# Patient Record
Sex: MALE | Race: WHITE | Employment: OTHER | URBAN - METROPOLITAN AREA
[De-identification: names, ages, dates, MRNs, and addresses within clinical notes are randomized per-mention and may not be internally consistent; named-entity substitution may affect disease eponyms.]

---

## 2017-01-16 ENCOUNTER — TRANSFERRED RECORDS (OUTPATIENT)
Dept: HEALTH INFORMATION MANAGEMENT | Facility: CLINIC | Age: 72
End: 2017-01-16

## 2017-01-16 DIAGNOSIS — M79.651 PAIN OF RIGHT THIGH: ICD-10-CM

## 2017-01-16 DIAGNOSIS — Z89.611 HISTORY OF ABOVE KNEE AMPUTATION, RIGHT (H): Primary | ICD-10-CM

## 2017-01-24 ENCOUNTER — HOSPITAL ENCOUNTER (OUTPATIENT)
Dept: NUCLEAR MEDICINE | Facility: CLINIC | Age: 72
Setting detail: NUCLEAR MEDICINE
Discharge: HOME | End: 2017-01-24
Attending: ORTHOPAEDIC SURGERY | Admitting: ORTHOPAEDIC SURGERY
Payer: MEDICARE

## 2017-01-24 ENCOUNTER — HOSPITAL ENCOUNTER (OUTPATIENT)
Dept: NUCLEAR MEDICINE | Facility: CLINIC | Age: 72
Setting detail: NUCLEAR MEDICINE
End: 2017-01-24
Attending: ORTHOPAEDIC SURGERY
Payer: MEDICARE

## 2017-01-24 DIAGNOSIS — M79.651 PAIN OF RIGHT THIGH: ICD-10-CM

## 2017-01-24 DIAGNOSIS — Z89.611 HISTORY OF ABOVE KNEE AMPUTATION, RIGHT (H): ICD-10-CM

## 2017-01-24 PROCEDURE — 34300033 ZZH RX 343: Performed by: ORTHOPAEDIC SURGERY

## 2017-01-24 PROCEDURE — A9561 TC99M OXIDRONATE: HCPCS | Performed by: ORTHOPAEDIC SURGERY

## 2017-01-24 PROCEDURE — 78315 BONE IMAGING 3 PHASE: CPT

## 2017-01-24 RX ADMIN — Medication 24.6 MILLICURIE: at 10:35

## 2017-06-19 ENCOUNTER — TRANSFERRED RECORDS (OUTPATIENT)
Dept: HEALTH INFORMATION MANAGEMENT | Facility: CLINIC | Age: 72
End: 2017-06-19

## 2017-08-17 ENCOUNTER — TELEPHONE (OUTPATIENT)
Dept: ORTHOPEDICS | Facility: CLINIC | Age: 72
End: 2017-08-17

## 2017-09-26 ENCOUNTER — HOSPITAL ENCOUNTER (OUTPATIENT)
Dept: EDUCATION SERVICES | Facility: CLINIC | Age: 72
End: 2017-09-26
Payer: MEDICARE

## 2017-09-26 NOTE — PLAN OF CARE
09/26/17 1444     Alisha Lezama-Registered Nurse (Nursing)  9/26/17 Patient (pt) and wife  to PLC Pre Op Joint Replacement group class. Both very attentive,asking questions,able to answer teach back,and verbalized understanding of content presented.Continue to reinforce information.Pt stated he also viewed the on line class. Pt's wife still feeling the pt may need a TCU as she is concerned she will not be able to physically help him the first few days.Florida Moses(Care Coordinator) in the clinic contacted.Pt and wife will address items discussed in class to get the home ready and possible other caregivers to help out.See also education flow sheet.  Written material given and reviewed in group class: PLC Class packet and Joint Replacement Book.

## 2017-09-27 ENCOUNTER — TRANSFERRED RECORDS (OUTPATIENT)
Dept: HEALTH INFORMATION MANAGEMENT | Facility: CLINIC | Age: 72
End: 2017-09-27

## 2017-10-27 RX ORDER — TAMSULOSIN HYDROCHLORIDE 0.4 MG/1
0.4 CAPSULE ORAL DAILY
COMMUNITY

## 2017-10-31 ENCOUNTER — ANESTHESIA EVENT (OUTPATIENT)
Dept: SURGERY | Facility: CLINIC | Age: 72
DRG: 470 | End: 2017-10-31
Payer: MEDICARE

## 2017-11-01 ASSESSMENT — LIFESTYLE VARIABLES: TOBACCO_USE: 1

## 2017-11-01 NOTE — ANESTHESIA PREPROCEDURE EVALUATION
Anesthesia Evaluation     . Pt has had prior anesthetic. Type: General    No history of anesthetic complications          ROS/MED HX    ENT/Pulmonary:     (+)tobacco use, Past use asthma , . .    Neurologic:  - neg neurologic ROS     Cardiovascular:     (+) Dyslipidemia, ----. : . . . :. . Previous cardiac testing date:results:date: results:ECG reviewed date:10/18/2017 results:Incomplete RBBB date: results:          METS/Exercise Tolerance:     Hematologic:         Musculoskeletal:   (+) arthritis, , , other musculoskeletal- Right above the knee amputation      GI/Hepatic: Comment: Crohn's    (+) Other GI/Hepatic       Renal/Genitourinary:  - ROS Renal section negative       Endo:     (+) thyroid problem hypothyroidism, .      Psychiatric:     (+) psychiatric history depression      Infectious Disease:         Malignancy:   (+) Malignancy History of Prostate and Other  Prostate CA Remission status post Surgery, Other CA Bladder cancer Remission status post Surgery         Other:                                    Anesthesia Plan      History & Physical Review  History and physical reviewed and following examination; no interval change.    ASA Status:  3 .    NPO Status:  > 8 hours    Plan for Spinal and Periph. Nerve Block for postop pain with Intravenous and Propofol induction. Maintenance will be TIVA.    PONV prophylaxis:  Ondansetron (or other 5HT-3) and Dexamethasone or Solumedrol       Postoperative Care  Postoperative pain management:  Multi-modal analgesia.      Consents  Anesthetic plan, risks, benefits and alternatives discussed with:  Patient..        ANESTHESIA PREOP EVALUATION    Procedure: Procedure(s):  Left Total Knee Arthroplasty                   (choice anesthesia) - Wound Class:     HPI: Palomo Zapata is a 72 year old male with Asthma, hypothyroidism and history of bladder and prostate cancer s/p surgery presenting for above procedure.    PMHx/PSHx/ROS:  Past Medical History:   Diagnosis Date      Above knee amputation of right lower extremity (H)      Anxiety      Cancer (H)      Complication of anesthesia     BP low after spinal      Depressive disorder      Thyroid disease      Uncomplicated asthma        Past Surgical History:   Procedure Laterality Date     GENITOURINARY SURGERY       ORTHOPEDIC SURGERY           Past Anes Hx: No personal or family h/o anesthesia problems    Soc Hx:   Social History   Substance Use Topics     Smoking status: Former Smoker     Quit date: 10/27/1988     Smokeless tobacco: Never Used     Alcohol use No       Allergies:   Allergies   Allergen Reactions     Penicillins      Palomo does not remember what the reaction was but it was said he is allergic       Meds:   No prescriptions prior to admission.       Current Outpatient Prescriptions   Medication Sig Dispense Refill     Finasteride (PROSCAR PO) Take 5 mg by mouth daily       tamsulosin (FLOMAX) 0.4 MG capsule Take by mouth daily       Escitalopram Oxalate (LEXAPRO PO) Take by mouth daily       beclomethasone (QVAR) 40 MCG/ACT Inhaler Inhale 1 puff into the lungs 2 times daily       ATORVASTATIN CALCIUM PO Take 10 mg by mouth       LEVOTHYROXINE SODIUM PO        prednisoLONE sodium phosphate (INFLAMASE FORTE) 1 % ophthalmic solution 1 drop 4 times daily         Physical Exam:  Vitals: There were no vitals taken for this visit.  BMI= There is no height or weight on file to calculate BMI.      Labs:  UPT: No results found for: HCGQUANT      BMP:  Recent Labs   Lab Test  07/03/16 2117   NA  130*   POTASSIUM  4.5   CHLORIDE  96   CO2  29   BUN  16   CR  0.75   GLC  102*   MÓNICA  8.6     CBC:   Recent Labs   Lab Test  07/03/16 2117   WBC  18.0*   RBC  4.02*   HGB  12.2*   HCT  37.1*   MCV  92   MCH  30.3   MCHC  32.9   RDW  12.8   PLT  374     Coags:  Recent Labs   Lab Test  08/15/10   0653   INR  1.17*       Assessment/Plan:  - ASA 3  - GETA with standard ASA monitors, IV induction, balanced anesthetic  - Pre-Op   -  Versed 0-2 mg   - Tylenol, Gabapentin   - Pre-Op regional block  - Pre-induction:   - PIVx1   - No arterial line, No central line   - Antibiotics per surgeon  - Induction:   - LMA   - Propofol,Fentanyl  - Maintenance:   - Sevoflurane   - Analgesia: Fentanyl   - Fluids: LR 1 mL/kg/hr maint  - Emergence:   - PONV prophylaxis: becky Jimenez Jr., MD  Anesthesia Resident - CA2  Pager: [phone]  11/1/2017  1:29 PM

## 2017-11-02 ENCOUNTER — APPOINTMENT (OUTPATIENT)
Dept: GENERAL RADIOLOGY | Facility: CLINIC | Age: 72
DRG: 470 | End: 2017-11-02
Attending: ORTHOPAEDIC SURGERY
Payer: MEDICARE

## 2017-11-02 ENCOUNTER — HOSPITAL ENCOUNTER (INPATIENT)
Facility: CLINIC | Age: 72
LOS: 3 days | Discharge: NURSING, REHAB OR LONG-TERM CARE | DRG: 470 | End: 2017-11-05
Attending: ORTHOPAEDIC SURGERY | Admitting: ORTHOPAEDIC SURGERY
Payer: MEDICARE

## 2017-11-02 ENCOUNTER — ANESTHESIA (OUTPATIENT)
Dept: SURGERY | Facility: CLINIC | Age: 72
DRG: 470 | End: 2017-11-02
Payer: MEDICARE

## 2017-11-02 DIAGNOSIS — Z29.89 NEED FOR PROPHYLAXIS AGAINST URINARY TRACT INFECTION: ICD-10-CM

## 2017-11-02 DIAGNOSIS — Z98.890 STATUS POST KNEE SURGERY: Primary | ICD-10-CM

## 2017-11-02 DIAGNOSIS — E78.5 HYPERLIPIDEMIA LDL GOAL <100: ICD-10-CM

## 2017-11-02 LAB
ABO + RH BLD: NORMAL
ABO + RH BLD: NORMAL
ALBUMIN UR-MCNC: NEGATIVE MG/DL
APPEARANCE UR: ABNORMAL
BILIRUB UR QL STRIP: NEGATIVE
BLD GP AB SCN SERPL QL: NORMAL
BLOOD BANK CMNT PATIENT-IMP: NORMAL
COLOR UR AUTO: ABNORMAL
CREAT SERPL-MCNC: 1.06 MG/DL (ref 0.66–1.25)
GFR SERPL CREATININE-BSD FRML MDRD: 68 ML/MIN/1.7M2
GLUCOSE SERPL-MCNC: 98 MG/DL (ref 70–99)
GLUCOSE UR STRIP-MCNC: NEGATIVE MG/DL
HGB UR QL STRIP: ABNORMAL
HYALINE CASTS #/AREA URNS LPF: 1 /LPF (ref 0–2)
KETONES UR STRIP-MCNC: NEGATIVE MG/DL
LEUKOCYTE ESTERASE UR QL STRIP: ABNORMAL
NITRATE UR QL: NEGATIVE
PH UR STRIP: 7 PH (ref 5–7)
POTASSIUM SERPL-SCNC: 4 MMOL/L (ref 3.4–5.3)
RBC #/AREA URNS AUTO: 1 /HPF (ref 0–2)
SOURCE: ABNORMAL
SP GR UR STRIP: 1 (ref 1–1.03)
SPECIMEN EXP DATE BLD: NORMAL
UROBILINOGEN UR STRIP-MCNC: NORMAL MG/DL (ref 0–2)
WBC #/AREA URNS AUTO: 165 /HPF (ref 0–2)

## 2017-11-02 PROCEDURE — 25000125 ZZHC RX 250: Performed by: STUDENT IN AN ORGANIZED HEALTH CARE EDUCATION/TRAINING PROGRAM

## 2017-11-02 PROCEDURE — 71000014 ZZH RECOVERY PHASE 1 LEVEL 2 FIRST HR: Performed by: ORTHOPAEDIC SURGERY

## 2017-11-02 PROCEDURE — 25000125 ZZHC RX 250: Performed by: ORTHOPAEDIC SURGERY

## 2017-11-02 PROCEDURE — 25000128 H RX IP 250 OP 636: Performed by: ANESTHESIOLOGY

## 2017-11-02 PROCEDURE — 25000128 H RX IP 250 OP 636: Performed by: STUDENT IN AN ORGANIZED HEALTH CARE EDUCATION/TRAINING PROGRAM

## 2017-11-02 PROCEDURE — C1713 ANCHOR/SCREW BN/BN,TIS/BN: HCPCS | Performed by: ORTHOPAEDIC SURGERY

## 2017-11-02 PROCEDURE — 99207 ZZC CONSULT E&M CHANGED TO SUBSEQUENT LEVEL: CPT | Performed by: INTERNAL MEDICINE

## 2017-11-02 PROCEDURE — 36000064 ZZH SURGERY LEVEL 4 EA 15 ADDTL MIN - UMMC: Performed by: ORTHOPAEDIC SURGERY

## 2017-11-02 PROCEDURE — 84132 ASSAY OF SERUM POTASSIUM: CPT | Performed by: ANESTHESIOLOGY

## 2017-11-02 PROCEDURE — 27210794 ZZH OR GENERAL SUPPLY STERILE: Performed by: ORTHOPAEDIC SURGERY

## 2017-11-02 PROCEDURE — 12000001 ZZH R&B MED SURG/OB UMMC

## 2017-11-02 PROCEDURE — 25000128 H RX IP 250 OP 636

## 2017-11-02 PROCEDURE — 82947 ASSAY GLUCOSE BLOOD QUANT: CPT | Performed by: ANESTHESIOLOGY

## 2017-11-02 PROCEDURE — 25000128 H RX IP 250 OP 636: Performed by: ORTHOPAEDIC SURGERY

## 2017-11-02 PROCEDURE — A9270 NON-COVERED ITEM OR SERVICE: HCPCS | Mod: GY | Performed by: STUDENT IN AN ORGANIZED HEALTH CARE EDUCATION/TRAINING PROGRAM

## 2017-11-02 PROCEDURE — 27810169 ZZH OR IMPLANT GENERAL: Performed by: ORTHOPAEDIC SURGERY

## 2017-11-02 PROCEDURE — 36000062 ZZH SURGERY LEVEL 4 1ST 30 MIN - UMMC: Performed by: ORTHOPAEDIC SURGERY

## 2017-11-02 PROCEDURE — 86850 RBC ANTIBODY SCREEN: CPT | Performed by: ORTHOPAEDIC SURGERY

## 2017-11-02 PROCEDURE — 25800025 ZZH RX 258: Performed by: ORTHOPAEDIC SURGERY

## 2017-11-02 PROCEDURE — 86900 BLOOD TYPING SEROLOGIC ABO: CPT | Performed by: ORTHOPAEDIC SURGERY

## 2017-11-02 PROCEDURE — 40000170 ZZH STATISTIC PRE-PROCEDURE ASSESSMENT II: Performed by: ORTHOPAEDIC SURGERY

## 2017-11-02 PROCEDURE — C1776 JOINT DEVICE (IMPLANTABLE): HCPCS | Performed by: ORTHOPAEDIC SURGERY

## 2017-11-02 PROCEDURE — 71000015 ZZH RECOVERY PHASE 1 LEVEL 2 EA ADDTL HR: Performed by: ORTHOPAEDIC SURGERY

## 2017-11-02 PROCEDURE — 86901 BLOOD TYPING SEROLOGIC RH(D): CPT | Performed by: ORTHOPAEDIC SURGERY

## 2017-11-02 PROCEDURE — 25000132 ZZH RX MED GY IP 250 OP 250 PS 637: Mod: GY | Performed by: ORTHOPAEDIC SURGERY

## 2017-11-02 PROCEDURE — S0077 INJECTION, CLINDAMYCIN PHOSP: HCPCS | Performed by: ORTHOPAEDIC SURGERY

## 2017-11-02 PROCEDURE — 82565 ASSAY OF CREATININE: CPT | Performed by: ANESTHESIOLOGY

## 2017-11-02 PROCEDURE — 99232 SBSQ HOSP IP/OBS MODERATE 35: CPT | Performed by: INTERNAL MEDICINE

## 2017-11-02 PROCEDURE — 87086 URINE CULTURE/COLONY COUNT: CPT | Performed by: ORTHOPAEDIC SURGERY

## 2017-11-02 PROCEDURE — 25000132 ZZH RX MED GY IP 250 OP 250 PS 637: Mod: GY | Performed by: INTERNAL MEDICINE

## 2017-11-02 PROCEDURE — 37000008 ZZH ANESTHESIA TECHNICAL FEE, 1ST 30 MIN: Performed by: ORTHOPAEDIC SURGERY

## 2017-11-02 PROCEDURE — 81001 URINALYSIS AUTO W/SCOPE: CPT | Performed by: ORTHOPAEDIC SURGERY

## 2017-11-02 PROCEDURE — 36415 COLL VENOUS BLD VENIPUNCTURE: CPT | Performed by: ORTHOPAEDIC SURGERY

## 2017-11-02 PROCEDURE — 0SRD0J9 REPLACEMENT OF LEFT KNEE JOINT WITH SYNTHETIC SUBSTITUTE, CEMENTED, OPEN APPROACH: ICD-10-PCS | Performed by: ORTHOPAEDIC SURGERY

## 2017-11-02 PROCEDURE — 25000125 ZZHC RX 250: Performed by: ANESTHESIOLOGY

## 2017-11-02 PROCEDURE — 25000128 H RX IP 250 OP 636: Performed by: INTERNAL MEDICINE

## 2017-11-02 PROCEDURE — A9270 NON-COVERED ITEM OR SERVICE: HCPCS | Mod: GY | Performed by: ORTHOPAEDIC SURGERY

## 2017-11-02 PROCEDURE — A9270 NON-COVERED ITEM OR SERVICE: HCPCS | Mod: GY | Performed by: INTERNAL MEDICINE

## 2017-11-02 PROCEDURE — C9290 INJ, BUPIVACAINE LIPOSOME: HCPCS | Performed by: ANESTHESIOLOGY

## 2017-11-02 PROCEDURE — 37000009 ZZH ANESTHESIA TECHNICAL FEE, EACH ADDTL 15 MIN: Performed by: ORTHOPAEDIC SURGERY

## 2017-11-02 PROCEDURE — 25000132 ZZH RX MED GY IP 250 OP 250 PS 637: Mod: GY | Performed by: STUDENT IN AN ORGANIZED HEALTH CARE EDUCATION/TRAINING PROGRAM

## 2017-11-02 PROCEDURE — 27210995 ZZH RX 272: Performed by: ORTHOPAEDIC SURGERY

## 2017-11-02 PROCEDURE — 40000986 XR KNEE PORT LT 1/2 VW: Mod: LT

## 2017-11-02 DEVICE — IMPLANTABLE DEVICE: Type: IMPLANTABLE DEVICE | Site: KNEE | Status: FUNCTIONAL

## 2017-11-02 DEVICE — BONE CEMENT SIMPLEX W/TOBRAMYCIN 6197-9-001: Type: IMPLANTABLE DEVICE | Site: KNEE | Status: FUNCTIONAL

## 2017-11-02 RX ORDER — BUPIVACAINE HYDROCHLORIDE AND EPINEPHRINE 2.5; 5 MG/ML; UG/ML
INJECTION, SOLUTION INFILTRATION; PERINEURAL PRN
Status: DISCONTINUED | OUTPATIENT
Start: 2017-11-02 | End: 2017-11-02

## 2017-11-02 RX ORDER — FINASTERIDE 5 MG/1
5 TABLET, FILM COATED ORAL DAILY
Status: DISCONTINUED | OUTPATIENT
Start: 2017-11-03 | End: 2017-11-05 | Stop reason: HOSPADM

## 2017-11-02 RX ORDER — ONDANSETRON 2 MG/ML
INJECTION INTRAMUSCULAR; INTRAVENOUS PRN
Status: DISCONTINUED | OUTPATIENT
Start: 2017-11-02 | End: 2017-11-02

## 2017-11-02 RX ORDER — FENTANYL CITRATE 50 UG/ML
25-50 INJECTION, SOLUTION INTRAMUSCULAR; INTRAVENOUS
Status: DISCONTINUED | OUTPATIENT
Start: 2017-11-02 | End: 2017-11-02 | Stop reason: HOSPADM

## 2017-11-02 RX ORDER — ACETAMINOPHEN 325 MG/1
650 TABLET ORAL EVERY 4 HOURS PRN
Status: DISCONTINUED | OUTPATIENT
Start: 2017-11-05 | End: 2017-11-05 | Stop reason: HOSPADM

## 2017-11-02 RX ORDER — FLUMAZENIL 0.1 MG/ML
0.2 INJECTION, SOLUTION INTRAVENOUS
Status: DISCONTINUED | OUTPATIENT
Start: 2017-11-02 | End: 2017-11-02 | Stop reason: CLARIF

## 2017-11-02 RX ORDER — SODIUM CHLORIDE, SODIUM LACTATE, POTASSIUM CHLORIDE, CALCIUM CHLORIDE 600; 310; 30; 20 MG/100ML; MG/100ML; MG/100ML; MG/100ML
INJECTION, SOLUTION INTRAVENOUS CONTINUOUS
Status: DISCONTINUED | OUTPATIENT
Start: 2017-11-02 | End: 2017-11-05 | Stop reason: HOSPADM

## 2017-11-02 RX ORDER — SODIUM CHLORIDE 9 MG/ML
INJECTION, SOLUTION INTRAVENOUS CONTINUOUS
Status: DISCONTINUED | OUTPATIENT
Start: 2017-11-02 | End: 2017-11-05 | Stop reason: HOSPADM

## 2017-11-02 RX ORDER — CLINDAMYCIN PHOSPHATE 900 MG/50ML
900 INJECTION, SOLUTION INTRAVENOUS EVERY 8 HOURS
Status: COMPLETED | OUTPATIENT
Start: 2017-11-02 | End: 2017-11-03

## 2017-11-02 RX ORDER — LABETALOL HYDROCHLORIDE 5 MG/ML
10 INJECTION, SOLUTION INTRAVENOUS
Status: DISCONTINUED | OUTPATIENT
Start: 2017-11-02 | End: 2017-11-02 | Stop reason: HOSPADM

## 2017-11-02 RX ORDER — SODIUM CHLORIDE, SODIUM LACTATE, POTASSIUM CHLORIDE, CALCIUM CHLORIDE 600; 310; 30; 20 MG/100ML; MG/100ML; MG/100ML; MG/100ML
INJECTION, SOLUTION INTRAVENOUS CONTINUOUS PRN
Status: DISCONTINUED | OUTPATIENT
Start: 2017-11-02 | End: 2017-11-02

## 2017-11-02 RX ORDER — NALOXONE HYDROCHLORIDE 0.4 MG/ML
.1-.4 INJECTION, SOLUTION INTRAMUSCULAR; INTRAVENOUS; SUBCUTANEOUS
Status: DISCONTINUED | OUTPATIENT
Start: 2017-11-02 | End: 2017-11-02

## 2017-11-02 RX ORDER — GABAPENTIN 300 MG/1
300 CAPSULE ORAL ONCE
Status: COMPLETED | OUTPATIENT
Start: 2017-11-02 | End: 2017-11-02

## 2017-11-02 RX ORDER — MAGNESIUM HYDROXIDE 1200 MG/15ML
LIQUID ORAL PRN
Status: DISCONTINUED | OUTPATIENT
Start: 2017-11-02 | End: 2017-11-02 | Stop reason: HOSPADM

## 2017-11-02 RX ORDER — FENTANYL CITRATE 50 UG/ML
25-50 INJECTION, SOLUTION INTRAMUSCULAR; INTRAVENOUS
Status: DISCONTINUED | OUTPATIENT
Start: 2017-11-02 | End: 2017-11-02 | Stop reason: CLARIF

## 2017-11-02 RX ORDER — DIPHENHYDRAMINE HYDROCHLORIDE 50 MG/ML
12.5 INJECTION INTRAMUSCULAR; INTRAVENOUS EVERY 6 HOURS PRN
Status: DISCONTINUED | OUTPATIENT
Start: 2017-11-02 | End: 2017-11-05 | Stop reason: HOSPADM

## 2017-11-02 RX ORDER — PROCHLORPERAZINE MALEATE 5 MG
5 TABLET ORAL EVERY 6 HOURS PRN
Status: DISCONTINUED | OUTPATIENT
Start: 2017-11-02 | End: 2017-11-05 | Stop reason: HOSPADM

## 2017-11-02 RX ORDER — DIPHENHYDRAMINE HCL 12.5MG/5ML
12.5 LIQUID (ML) ORAL EVERY 6 HOURS PRN
Status: DISCONTINUED | OUTPATIENT
Start: 2017-11-02 | End: 2017-11-05 | Stop reason: HOSPADM

## 2017-11-02 RX ORDER — EPHEDRINE SULFATE 50 MG/ML
INJECTION, SOLUTION INTRAVENOUS PRN
Status: DISCONTINUED | OUTPATIENT
Start: 2017-11-02 | End: 2017-11-02

## 2017-11-02 RX ORDER — ACETAMINOPHEN 325 MG/1
975 TABLET ORAL ONCE
Status: COMPLETED | OUTPATIENT
Start: 2017-11-02 | End: 2017-11-02

## 2017-11-02 RX ORDER — LATANOPROST 50 UG/ML
1 SOLUTION/ DROPS OPHTHALMIC AT BEDTIME
COMMUNITY

## 2017-11-02 RX ORDER — TAMSULOSIN HYDROCHLORIDE 0.4 MG/1
0.4 CAPSULE ORAL DAILY
Status: DISCONTINUED | OUTPATIENT
Start: 2017-11-03 | End: 2017-11-05 | Stop reason: HOSPADM

## 2017-11-02 RX ORDER — LATANOPROST 50 UG/ML
1 SOLUTION/ DROPS OPHTHALMIC AT BEDTIME
Status: DISCONTINUED | OUTPATIENT
Start: 2017-11-02 | End: 2017-11-05 | Stop reason: HOSPADM

## 2017-11-02 RX ORDER — ONDANSETRON 4 MG/1
4 TABLET, ORALLY DISINTEGRATING ORAL EVERY 6 HOURS PRN
Status: DISCONTINUED | OUTPATIENT
Start: 2017-11-02 | End: 2017-11-05 | Stop reason: HOSPADM

## 2017-11-02 RX ORDER — ATORVASTATIN CALCIUM 10 MG/1
10 TABLET, FILM COATED ORAL
Status: DISCONTINUED | OUTPATIENT
Start: 2017-11-02 | End: 2017-11-05 | Stop reason: HOSPADM

## 2017-11-02 RX ORDER — PROPOFOL 10 MG/ML
INJECTION, EMULSION INTRAVENOUS PRN
Status: DISCONTINUED | OUTPATIENT
Start: 2017-11-02 | End: 2017-11-02

## 2017-11-02 RX ORDER — METOCLOPRAMIDE HYDROCHLORIDE 5 MG/ML
5 INJECTION INTRAMUSCULAR; INTRAVENOUS EVERY 6 HOURS PRN
Status: DISCONTINUED | OUTPATIENT
Start: 2017-11-02 | End: 2017-11-05 | Stop reason: HOSPADM

## 2017-11-02 RX ORDER — SODIUM CHLORIDE, SODIUM LACTATE, POTASSIUM CHLORIDE, CALCIUM CHLORIDE 600; 310; 30; 20 MG/100ML; MG/100ML; MG/100ML; MG/100ML
INJECTION, SOLUTION INTRAVENOUS CONTINUOUS
Status: DISCONTINUED | OUTPATIENT
Start: 2017-11-02 | End: 2017-11-02 | Stop reason: HOSPADM

## 2017-11-02 RX ORDER — SODIUM CHLORIDE 9 MG/ML
INJECTION, SOLUTION INTRAVENOUS
Status: COMPLETED
Start: 2017-11-02 | End: 2017-11-02

## 2017-11-02 RX ORDER — ONDANSETRON 2 MG/ML
4 INJECTION INTRAMUSCULAR; INTRAVENOUS EVERY 30 MIN PRN
Status: DISCONTINUED | OUTPATIENT
Start: 2017-11-02 | End: 2017-11-02 | Stop reason: HOSPADM

## 2017-11-02 RX ORDER — GABAPENTIN 100 MG/1
100 CAPSULE ORAL
Status: DISCONTINUED | OUTPATIENT
Start: 2017-11-02 | End: 2017-11-02 | Stop reason: CLARIF

## 2017-11-02 RX ORDER — ACETAMINOPHEN 325 MG/1
975 TABLET ORAL EVERY 8 HOURS
Status: COMPLETED | OUTPATIENT
Start: 2017-11-02 | End: 2017-11-05

## 2017-11-02 RX ORDER — ESCITALOPRAM OXALATE 10 MG/1
20 TABLET ORAL DAILY
Status: DISCONTINUED | OUTPATIENT
Start: 2017-11-03 | End: 2017-11-05 | Stop reason: HOSPADM

## 2017-11-02 RX ORDER — NALOXONE HYDROCHLORIDE 0.4 MG/ML
.1-.4 INJECTION, SOLUTION INTRAMUSCULAR; INTRAVENOUS; SUBCUTANEOUS
Status: DISCONTINUED | OUTPATIENT
Start: 2017-11-02 | End: 2017-11-05 | Stop reason: HOSPADM

## 2017-11-02 RX ORDER — METOCLOPRAMIDE 5 MG/1
5 TABLET ORAL EVERY 6 HOURS PRN
Status: DISCONTINUED | OUTPATIENT
Start: 2017-11-02 | End: 2017-11-05 | Stop reason: HOSPADM

## 2017-11-02 RX ORDER — ONDANSETRON 4 MG/1
4 TABLET, ORALLY DISINTEGRATING ORAL EVERY 30 MIN PRN
Status: DISCONTINUED | OUTPATIENT
Start: 2017-11-02 | End: 2017-11-02 | Stop reason: HOSPADM

## 2017-11-02 RX ORDER — AMOXICILLIN 250 MG
1-2 CAPSULE ORAL 2 TIMES DAILY
Status: DISCONTINUED | OUTPATIENT
Start: 2017-11-02 | End: 2017-11-05 | Stop reason: HOSPADM

## 2017-11-02 RX ORDER — CLINDAMYCIN PHOSPHATE 900 MG/50ML
900 INJECTION, SOLUTION INTRAVENOUS
Status: COMPLETED | OUTPATIENT
Start: 2017-11-02 | End: 2017-11-02

## 2017-11-02 RX ORDER — CLINDAMYCIN PHOSPHATE 900 MG/50ML
900 INJECTION, SOLUTION INTRAVENOUS SEE ADMIN INSTRUCTIONS
Status: DISCONTINUED | OUTPATIENT
Start: 2017-11-02 | End: 2017-11-02 | Stop reason: CLARIF

## 2017-11-02 RX ORDER — BUPIVACAINE HYDROCHLORIDE AND EPINEPHRINE 5; 5 MG/ML; UG/ML
INJECTION, SOLUTION PERINEURAL PRN
Status: DISCONTINUED | OUTPATIENT
Start: 2017-11-02 | End: 2017-11-02 | Stop reason: HOSPADM

## 2017-11-02 RX ORDER — OXYCODONE HYDROCHLORIDE 5 MG/1
5-10 TABLET ORAL EVERY 4 HOURS PRN
Status: DISCONTINUED | OUTPATIENT
Start: 2017-11-02 | End: 2017-11-05

## 2017-11-02 RX ORDER — LIDOCAINE 40 MG/G
CREAM TOPICAL
Status: DISCONTINUED | OUTPATIENT
Start: 2017-11-02 | End: 2017-11-05 | Stop reason: HOSPADM

## 2017-11-02 RX ORDER — CELECOXIB 200 MG/1
200 CAPSULE ORAL ONCE
Status: COMPLETED | OUTPATIENT
Start: 2017-11-02 | End: 2017-11-02

## 2017-11-02 RX ORDER — PROPOFOL 10 MG/ML
INJECTION, EMULSION INTRAVENOUS CONTINUOUS PRN
Status: DISCONTINUED | OUTPATIENT
Start: 2017-11-02 | End: 2017-11-02

## 2017-11-02 RX ORDER — ONDANSETRON 2 MG/ML
4 INJECTION INTRAMUSCULAR; INTRAVENOUS EVERY 6 HOURS PRN
Status: DISCONTINUED | OUTPATIENT
Start: 2017-11-02 | End: 2017-11-05 | Stop reason: HOSPADM

## 2017-11-02 RX ORDER — BUPIVACAINE HYDROCHLORIDE 7.5 MG/ML
INJECTION, SOLUTION INTRASPINAL PRN
Status: DISCONTINUED | OUTPATIENT
Start: 2017-11-02 | End: 2017-11-02

## 2017-11-02 RX ADMIN — OXYCODONE HYDROCHLORIDE 5 MG: 5 TABLET ORAL at 22:10

## 2017-11-02 RX ADMIN — HYDROMORPHONE HYDROCHLORIDE 0.5 MG: 1 INJECTION, SOLUTION INTRAMUSCULAR; INTRAVENOUS; SUBCUTANEOUS at 15:00

## 2017-11-02 RX ADMIN — HYDROMORPHONE HYDROCHLORIDE 0.5 MG: 1 INJECTION, SOLUTION INTRAMUSCULAR; INTRAVENOUS; SUBCUTANEOUS at 13:38

## 2017-11-02 RX ADMIN — ONDANSETRON 4 MG: 2 INJECTION INTRAMUSCULAR; INTRAVENOUS at 12:35

## 2017-11-02 RX ADMIN — PROPOFOL 25 MCG/KG/MIN: 10 INJECTION, EMULSION INTRAVENOUS at 10:31

## 2017-11-02 RX ADMIN — BUPIVACAINE 10 ML: 13.3 INJECTION, SUSPENSION, LIPOSOMAL INFILTRATION at 10:04

## 2017-11-02 RX ADMIN — PHENYLEPHRINE HYDROCHLORIDE 100 MCG: 10 INJECTION, SOLUTION INTRAMUSCULAR; INTRAVENOUS; SUBCUTANEOUS at 12:57

## 2017-11-02 RX ADMIN — CLINDAMYCIN PHOSPHATE 900 MG: 18 INJECTION, SOLUTION INTRAVENOUS at 10:30

## 2017-11-02 RX ADMIN — ACETAMINOPHEN 975 MG: 325 TABLET, FILM COATED ORAL at 09:20

## 2017-11-02 RX ADMIN — FLUTICASONE FUROATE 1 PUFF: 100 POWDER RESPIRATORY (INHALATION) at 23:29

## 2017-11-02 RX ADMIN — SODIUM CHLORIDE, POTASSIUM CHLORIDE, SODIUM LACTATE AND CALCIUM CHLORIDE: 600; 310; 30; 20 INJECTION, SOLUTION INTRAVENOUS at 09:43

## 2017-11-02 RX ADMIN — FENTANYL CITRATE 30 MCG: 50 INJECTION INTRAMUSCULAR; INTRAVENOUS at 10:20

## 2017-11-02 RX ADMIN — PHENYLEPHRINE HYDROCHLORIDE 100 MCG: 10 INJECTION, SOLUTION INTRAMUSCULAR; INTRAVENOUS; SUBCUTANEOUS at 11:28

## 2017-11-02 RX ADMIN — LATANOPROST 1 DROP: 50 SOLUTION OPHTHALMIC at 23:27

## 2017-11-02 RX ADMIN — CELECOXIB 200 MG: 200 CAPSULE ORAL at 09:21

## 2017-11-02 RX ADMIN — PROPOFOL 10 MG: 10 INJECTION, EMULSION INTRAVENOUS at 12:08

## 2017-11-02 RX ADMIN — Medication 125 MCG: at 17:23

## 2017-11-02 RX ADMIN — ATORVASTATIN CALCIUM 10 MG: 10 TABLET, FILM COATED ORAL at 19:52

## 2017-11-02 RX ADMIN — BUPIVACAINE HYDROCHLORIDE AND EPINEPHRINE BITARTRATE 10 ML: 2.5; .005 INJECTION, SOLUTION INFILTRATION; PERINEURAL at 10:04

## 2017-11-02 RX ADMIN — BUPIVACAINE HYDROCHLORIDE IN DEXTROSE 1.6 ML: 7.5 INJECTION, SOLUTION SUBARACHNOID at 10:25

## 2017-11-02 RX ADMIN — GABAPENTIN 300 MG: 300 CAPSULE ORAL at 09:20

## 2017-11-02 RX ADMIN — EPHEDRINE SULFATE 10 MG: 50 INJECTION, SOLUTION INTRAVENOUS at 12:53

## 2017-11-02 RX ADMIN — ACETAMINOPHEN 975 MG: 325 TABLET, FILM COATED ORAL at 17:23

## 2017-11-02 RX ADMIN — PHENYLEPHRINE HYDROCHLORIDE 0.2 MCG/KG/MIN: 10 INJECTION, SOLUTION INTRAMUSCULAR; INTRAVENOUS; SUBCUTANEOUS at 10:52

## 2017-11-02 RX ADMIN — SODIUM CHLORIDE: 9 INJECTION, SOLUTION INTRAVENOUS at 17:23

## 2017-11-02 RX ADMIN — SODIUM CHLORIDE 1 G: 9 INJECTION, SOLUTION INTRAVENOUS at 10:32

## 2017-11-02 RX ADMIN — SODIUM CHLORIDE 500 ML: 9 INJECTION, SOLUTION INTRAVENOUS at 19:52

## 2017-11-02 RX ADMIN — PHENYLEPHRINE HYDROCHLORIDE 100 MCG: 10 INJECTION, SOLUTION INTRAMUSCULAR; INTRAVENOUS; SUBCUTANEOUS at 11:45

## 2017-11-02 RX ADMIN — PROPOFOL 20 MG: 10 INJECTION, EMULSION INTRAVENOUS at 11:43

## 2017-11-02 RX ADMIN — EPHEDRINE SULFATE 10 MG: 50 INJECTION, SOLUTION INTRAVENOUS at 11:33

## 2017-11-02 RX ADMIN — SODIUM CHLORIDE, POTASSIUM CHLORIDE, SODIUM LACTATE AND CALCIUM CHLORIDE: 600; 310; 30; 20 INJECTION, SOLUTION INTRAVENOUS at 10:39

## 2017-11-02 RX ADMIN — EPHEDRINE SULFATE 10 MG: 50 INJECTION, SOLUTION INTRAVENOUS at 12:35

## 2017-11-02 RX ADMIN — HYDROMORPHONE HYDROCHLORIDE 0.5 MG: 1 INJECTION, SOLUTION INTRAMUSCULAR; INTRAVENOUS; SUBCUTANEOUS at 13:25

## 2017-11-02 RX ADMIN — SENNOSIDES AND DOCUSATE SODIUM 2 TABLET: 8.6; 5 TABLET ORAL at 19:52

## 2017-11-02 RX ADMIN — FENTANYL CITRATE 25 MCG: 50 INJECTION INTRAMUSCULAR; INTRAVENOUS at 10:01

## 2017-11-02 RX ADMIN — EPHEDRINE SULFATE 5 MG: 50 INJECTION, SOLUTION INTRAVENOUS at 12:46

## 2017-11-02 RX ADMIN — CLINDAMYCIN PHOSPHATE 900 MG: 18 INJECTION, SOLUTION INTRAVENOUS at 18:13

## 2017-11-02 NOTE — IP AVS SNAPSHOT
MRN:[id number]                      After Visit Summary   11/2/2017    Palomo Zapata    MRN: [id number]           Thank you!     Thank you for choosing [facility] for your care. Our goal is always to provide you with excellent care. Hearing back from our patients is one way we can continue to improve our services. Please take a few minutes to complete the written survey that you may receive in the mail after you visit with us. Thank you!        Patient Information     Date Of Birth          1945        Designated Caregiver       Most Recent Value    Caregiver    Will someone help with your care after discharge? yes [rehab first]    Name of designated caregiver Ema    Phone number of caregiver Lives with pt    Caregiver address lives with pt      About your hospital stay     You were admitted on:  November 2, 2017 You last received care in the:  [facility] 8A    You were discharged on:  November 5, 2017        Reason for your hospital stay       You were admitted to the hospital following your total knee arthroplasty.                  Who to Call     For medical emergencies, please call 911.  For non-urgent questions about your medical care, please call your primary care provider or clinic, [phone]  For questions related to your surgery, please call your surgery clinic        Attending Provider     Provider Karlos Conn MD Orthopedics       Primary Care Provider Office Phone # Fax #    [facility] [phone]       When to contact your care team       CALL YOUR PHYSICIAN IF:  -Pain in your hip persists or worsens in the first few days after surgery.  -Excessive redness or drainage of cloudy or bloody material from the wounds (Clear red tinted fluid and some mild drainage should be expected). Drainage of any kind 5 days after surgery should be reported to the doctor.  -You have a temperature elevation greater than 101.5    -You have pain, swelling or  redness in your calf.  -You have numbness or weakness in your leg or foot.      You may contact your physician at [phone] during business hours.    For after hours or weekend calls, you may contact the hospital at [phone] and ask for the on-call orthopedic resident                  After Care Instructions     Activity       Your activity will be as tolerated. You should focus on knee range of motion exercises, with an emphasis on obtaining full hyperextension to 90 degrees knee flexion            Diet       You may return to your regular, pre-surgery diet unless instructed otherwise by your provider.            Discharge Instructions       TOTAL KNEE ARTHROPLASTY POSTOPERATIVE INSTRUCTIONS    A.  COMFORT:  -Elevation: Elevate your knee and ankle above the level of your heart. The best position is lying down with two pillows lengthwise under your entire leg. Do not place pillows under your knee. This should be done for the first several days after surgery.  -Swelling: An ice pack will be provided to control swelling and discomfort. Place a thin towel between your skin and the ice pack and apply for 20 minutes.   -Pain Medication: Take medication as prescribed, but only as often as necessary.  Avoid alcohol and driving if you are taking pain medication.  Remember that Tylenol can be used for pain relief as well, as you wean off the narcotics.  Maximum Tylenol dose for a single day is 4000 mg.            B.  ACTIVITIES:  -Exercises: Point and flex your feet and wiggle your toes, move your ankle around in a [place], to help prevent complications such as blood clotting in your legs. Quad muscle isometric and short arc exercises should begin the day of surgery and should be done for 10 to 15 minutes, 3 times a day, for the first few weeks after surgery. Patient to focus on full hyperextension to 90 degrees knee flexion    -CPM: 0 degrees - flexion tolerance with goal of 90 degrees flexion  -Weight bearing  status: You may put weight on your operative leg (weight bearing as tolerated) but may be limited due to pain. Walk using assistive devices as needed.   -Physical therapy: A prescription for physical therapy will be administered. You will also be provided with a physical therapy protocol. Both the prescription and protocol should be taken with you to your first appointment  -Driving: Driving is NOT permitted until allowed by your provider.  -Athletic activities: Athletic activities, such as swimming, bicycling, jogging, running and stop-and-go-sports should be avoided until allowed by your doctor.  -Return to work: May returned to work when allowed by your provider.     C. INCISION CARE:    -Keep the initial post-op dressing on for 7 days, as discussed above. You may shower 72 hours after surgery, however the dressing on your knee should remain in place during showering. It is acceptable for water to run over the dressing, but you are not to soak or submerge your wound underwater.            Wound care and dressings       A clean dressing/bandage was placed on your surgical wound shortly before you were discharged from the hospital. This dressing is to stay in place for at least 7 days following your surgery. If the dressing becomes saturated with wound drainage, it is appropriate to replace the dressing/bandage with sterile 4x4 gauze with tape over top to secure the gauze about the wound. If drainage persists from the incision site to the extent that it is frequently saturating the dressing, please contact your clinic nurse.                  Follow-up Appointments     Adult [facility] Follow-up and recommended labs and tests       You are to return to clinic in 2 weeks for wound check with the clinic nurse. Approximately 6 weeks after your surgery, you will be scheduled for an appointment with your doctor. At this time, AP and lateral knee imaging will be obtained.    If you need to schedule your 2 and 6 week  "postoperative visits or haven't received confirmation regarding these visits, please call one of the following numbers within 7 days of discharge.    -[facility]: [phone]  -[facility]: [phone]                  Your next 10 appointments already scheduled     Nov 16, 2017 12:30 PM CST   (Arrive by 12:15 PM)   Post-Op with KINGSTON Rodas CNP   [facility] ([facility])    [address] [place] [id number] [phone]            Dec 11, 2017  1:15 PM CST   (Arrive by 1:00 PM)   Return Visit with Karlos Hill MD   [facility] ([facility])    [address] [place] [id number] [phone]              Future tests that were ordered for you     Assign Questionnaire Series to Patient                 Pending Results     No orders found from 10/31/2017 to 11/3/2017.            Statement of Approval     Ordered          11/05/17 1013  I have reviewed and agree with all the recommendations and orders detailed in this document.  EFFECTIVE NOW     Approved and electronically signed by:  Karlos Hill MD             Admission Information     Date & Time Provider Department Dept. Phone    11/2/2017 Karlos Hill MD  8A [phone]      Your Vitals Were     Blood Pressure Pulse Temperature Respirations Height Weight    134/68 (BP Location: Left arm) 97 96.5  F (35.8  C) (Axillary) 16 1.829 m (6') 94.6 kg (208 lb 8.9 oz)    Pulse Oximetry BMI (Body Mass Index)                95% 28.29 kg/m2          [employer] Information     [employer] lets you send messages to your doctor, view your test results, renew your prescriptions, schedule appointments and more. To sign up, go to [email or web address] . Click on \"Log in\" on the left side of the screen, which will take you to the Welcome page. Then click on \"Sign up Now\" on " the right side of the page.     You will be asked to enter the access code listed below, as well as some personal information. Please follow the directions to create your username and password.     Your access code is: [email or web address]  Expires: 2018  5:30 AM     Your access code will  in 90 days. If you need help or a new code, please call your [facility] clinic or [phone].        Care EveryWhere ID     This is your Care EveryWhere ID. This could be used by other organizations to access your [facility] medical records  [id number]        Equal Access to Services     [facility]: Hadkaylin Canela, jong baum, dalton simpson, kristopher casey . So [facility] [phone].    ATENCIÓN: Si habla español, tiene a maciel disposición servicios gratuitos de asistencia lingüística. Llame al [phone].    We comply with applicable federal civil rights laws and [place] laws. We do not discriminate on the basis of race, color, national origin, age, disability, sex, sexual orientation, or gender identity.               Review of your medicines      START taking        Dose / Directions    acetaminophen 325 MG tablet   Commonly known as:  TYLENOL        Dose:  650 mg   Take 2 tablets (650 mg) by mouth every 4 hours as needed for other (surgical pain)   Quantity:  100 tablet   Refills:  1       aspirin 325 MG EC tablet        Dose:  325 mg   Take 1 tablet (325 mg) by mouth daily for 28 days   Quantity:  28 tablet   Refills:  0       cephALEXin 500 MG capsule   Commonly known as:  KEFLEX   Indication:  Urinary Tract Infection, UTI Prevention   Used for:  Need for prophylaxis against urinary tract infection        Dose:  500 mg   Start taking on:  2017   Take 1 capsule (500 mg) by mouth daily   Quantity:  11 capsule   Refills:  0       oxyCODONE IR 5 MG tablet   Commonly known as:  ROXICODONE        Dose:  5-10 mg   Take 1-2 tablets (5-10 mg) by mouth every 3 hours as  needed for moderate to severe pain   Quantity:  80 tablet   Refills:  0       senna-docusate 8.6-50 MG per tablet   Commonly known as:  SENOKOT-S;PERICOLACE        Dose:  1-2 tablet   Take 1-2 tablets by mouth 2 times daily   Quantity:  40 tablet   Refills:  1         CONTINUE these medicines which may have CHANGED, or have new prescriptions. If we are uncertain of the size of tablets/capsules you have at home, strength may be listed as something that might have changed.        Dose / Directions    atorvastatin 10 MG tablet   Commonly known as:  LIPITOR   This may have changed:  when to take this   Used for:  Hyperlipidemia LDL goal <100        Dose:  10 mg   Take 1 tablet (10 mg) by mouth daily   Quantity:  30 tablet   Refills:  0         CONTINUE these medicines which have NOT CHANGED        Dose / Directions    beclomethasone 40 MCG/ACT Inhaler   Commonly known as:  QVAR        Dose:  1 puff   Inhale 1 puff into the lungs 2 times daily   Refills:  0       FLOMAX 0.4 MG capsule   Generic drug:  tamsulosin        Dose:  0.4 mg   Take 0.4 mg by mouth daily   Refills:  0       latanoprost 0.005 % ophthalmic solution   Commonly known as:  XALATAN        Dose:  1 drop   Place 1 drop Into the left eye At Bedtime   Refills:  0       LEVOTHYROXINE SODIUM PO        Dose:  125 mcg   Take 125 mcg by mouth daily   Refills:  0       LEXAPRO PO        Dose:  20 mg   Take 20 mg by mouth daily   Refills:  0       PROSCAR PO        Dose:  5 mg   Take 5 mg by mouth daily   Refills:  0            Where to get your medicines      These medications were sent to [facility] [place], [place] - [address]  [address] [address]     Phone:  [phone]     acetaminophen 325 MG tablet    aspirin 325 MG EC tablet    cephALEXin 500 MG capsule    senna-docusate 8.6-50 MG per tablet         Some of these will need a paper prescription and others can be bought over the counter. Ask your nurse if you  have questions.     Bring a paper prescription for each of these medications     oxyCODONE IR 5 MG tablet       You don't need a prescription for these medications     atorvastatin 10 MG tablet               ANTIBIOTIC INSTRUCTION     You've Been Prescribed an Antibiotic - Now What?  Your healthcare team thinks that you or your loved one might have an infection. Some infections can be treated with antibiotics, which are powerful, life-saving drugs. Like all medications, antibiotics have side effects and should only be used when necessary. There are some important things you should know about your antibiotic treatment.      Your healthcare team may run tests before you start taking an antibiotic.    Your team may take samples (e.g., from your blood, urine or other areas) to run tests to look for bacteria. These test can be important to determine if you need an antibiotic at all and, if you do, which antibiotic will work best.      Within a few days, your healthcare team might change or even stop your antibiotic.    Your team may start you on an antibiotic while they are working to find out what is making you sick.    Your team might change your antibiotic because test results show that a different antibiotic would be better to treat your infection.    In some cases, once your team has more information, they learn that you do not need an antibiotic at all. They may find out that you don't have an infection, or that the antibiotic you're taking won't work against your infection. For example, an infection caused by a virus can't be treated with antibiotics. Staying on an antibiotic when you don't need it is more likely to be harmful than helpful.      You may experience side effects from your antibiotic.    Like all medications, antibiotics have side effects. Some of these can be serious.    Let you healthcare team know if you have any known allergies when you are admitted to the hospital.    One significant side effect  of nearly all antibiotics is the risk of severe and sometimes deadly diarrhea caused by Clostridium difficile (C. Difficile). This occurs when a person takes antibiotics because some good germs are destroyed. Antibiotic use allows C. diificile to take over, putting patients at high risk for this serious infection.    As a patient or caregiver, it is important to understand your or your loved one's antibiotic treatment. It is especially important for caregivers to speak up when patients can't speak for themselves. Here are some important questions to ask your healthcare team.    What infection is this antibiotic treating and how do you know I have that infection?    What side effects might occur from this antibiotic?    How long will I need to take this antibiotic?    Is it safe to take this antibiotic with other medications or supplements (e.g., vitamins) that I am taking?     Are there any special directions I need to know about taking this antibiotic? For example, should I take it with food?    How will I be monitored to know whether my infection is responding to the antibiotic?    What tests may help to make sure the right antibiotic is prescribed for me?      Information provided by:  www.cdc.gov/getsmart  U.S. Department of Health and Human Services  Centers for disease Control and Prevention  National Center for Emerging and Zoonotic Infectious Diseases  Division of Healthcare Quality Promotion         Protect others around you: Learn how to safely use, store and throw away your medicines at www.disposemymeds.org.             Medication List: This is a list of all your medications and when to take them. Check marks below indicate your daily home schedule. Keep this list as a reference.      Medications           Morning Afternoon Evening Bedtime As Needed    acetaminophen 325 MG tablet   Commonly known as:  TYLENOL   Take 2 tablets (650 mg) by mouth every 4 hours as needed for other (surgical pain)   Last time  this was given:  975 mg on 11/5/2017  8:36 AM                                aspirin 325 MG EC tablet   Take 1 tablet (325 mg) by mouth daily for 28 days   Last time this was given:  325 mg on 11/5/2017  8:36 AM                                atorvastatin 10 MG tablet   Commonly known as:  LIPITOR   Take 1 tablet (10 mg) by mouth daily   Last time this was given:  10 mg on 11/4/2017  7:46 PM                                beclomethasone 40 MCG/ACT Inhaler   Commonly known as:  QVAR   Inhale 1 puff into the lungs 2 times daily                                cephALEXin 500 MG capsule   Commonly known as:  KEFLEX   Take 1 capsule (500 mg) by mouth daily   Start taking on:  11/6/2017   Last time this was given:  500 mg on 11/5/2017  8:36 AM                                FLOMAX 0.4 MG capsule   Take 0.4 mg by mouth daily   Last time this was given:  0.4 mg on 11/5/2017  8:36 AM   Generic drug:  tamsulosin                                latanoprost 0.005 % ophthalmic solution   Commonly known as:  XALATAN   Place 1 drop Into the left eye At Bedtime   Last time this was given:  1 drop on 11/4/2017  9:36 PM                                LEVOTHYROXINE SODIUM PO   Take 125 mcg by mouth daily   Last time this was given:  125 mcg on 11/5/2017  8:36 AM                                LEXAPRO PO   Take 20 mg by mouth daily   Last time this was given:  20 mg on 11/5/2017  8:36 AM                                oxyCODONE IR 5 MG tablet   Commonly known as:  ROXICODONE   Take 1-2 tablets (5-10 mg) by mouth every 3 hours as needed for moderate to severe pain   Last time this was given:  10 mg on 11/5/2017 10:03 AM                                PROSCAR PO   Take 5 mg by mouth daily   Last time this was given:  5 mg on 11/5/2017  8:36 AM                                senna-docusate 8.6-50 MG per tablet   Commonly known as:  SENOKOT-S;PERICOLACE   Take 1-2 tablets by mouth 2 times daily   Last time this was given:  2 tablets on  11/5/2017  8:37 AM

## 2017-11-02 NOTE — ANESTHESIA PROCEDURE NOTES
Spinal/LP Procedure Note    Spinal Block  Staff:     Anesthesiologist:  MEME JULIO    Resident/CRNA:  JANENE GUTIERREZ    Spinal/LP performed by resident/CRNA in presence of a teaching physician.    Location: In OR BEFORE Induction  Procedure Start/Stop Times:      11/2/2017 10:17 AM     11/2/2017 10:26 AM    patient identified, IV checked, site marked, risks and benefits discussed, informed consent, monitors and equipment checked, pre-op evaluation, at physician/surgeon's request and post-op pain management      Correct Patient: Yes      Correct Position: Yes      Correct Site: Yes      Correct Procedure: Yes      Correct Laterality:  N/A    Site Marked:  N/A  Procedure:     Procedure:  Intrathecal    ASA:  3    Diagnosis:  Surgical anesthesia    Position:  Sitting    Sterile Prep: chloraprep, mask, sterile gloves and patient draped      Insertion site:  L3-4    Approach:  Midline    Needle Type:  David    Needle gauge (G):  25    Local Skin Infiltration:  1% lidocaine    amount (ml):  3    Needle Length (in):  3.5    Introducer used: Yes      Introducer gauge:  20 G    Attempts:  1    Redirects:  1    CSF:  Clear    Paresthesias:  No    Time injected:  10:25  Assessment/Narrative:     Sensory Level:  T12

## 2017-11-02 NOTE — IP AVS SNAPSHOT
` `     UR 8A: [phone]            Medication Administration Report for Palomo Zapata as of 11/05/17 1233   Legend:    Given Hold Not Given Due Canceled Entry Other Actions    Time Time (Time) Time  Time-Action       Inactive    Active    Linked        Medications 10/30/17 10/31/17 11/01/17 11/02/17 11/03/17 11/04/17 11/05/17    0.9% sodium chloride infusion  Rate: 125 mL/hr Freq: CONTINUOUS Route: IV  Start: 11/02/17 1600   Admin Instructions: Change to saline lock when PO well tolerated.        1723 ( )-New Bag       1952 ( )-Rate/Dose Change        0116 ( )-New Bag       0808 ( )-New Bag       1922 ( )-New Bag        0140 ( )-New Bag            acetaminophen (TYLENOL) tablet 650 mg  Dose: 650 mg Freq: EVERY 4 HOURS PRN Route: PO  PRN Reason: other  PRN Comment: surgical pain  Start: 11/05/17 0000   Admin Instructions: May give first dose 4 hours after last scheduled dose of acetaminophen.  Maximum acetaminophen dose from all sources = 75 mg/kg/day not to exceed 4 grams/day.               aspirin EC EC tablet 325 mg  Dose: 325 mg Freq: DAILY Route: PO  Start: 11/03/17 0800   Admin Instructions: DO NOT CRUSH.         0750 (325 mg)-Given        0847 (325 mg)-Given        0836 (325 mg)-Given           atorvastatin (LIPITOR) tablet 10 mg  Dose: 10 mg Freq: DAILY AT 8PM Route: PO  Start: 11/02/17 2000 1952 (10 mg)-Given        2023 (10 mg)-Given        1946 (10 mg)-Given        [ ] 2000           benzocaine-menthol (CEPACOL) 15-3.6 MG lozenge 1-2 lozenge  Dose: 1-2 lozenge Freq: EVERY 1 HOUR PRN Route: BU  PRN Reason: sore throat  PRN Comment: sore throat without fever  Start: 11/02/17 1554              bisacodyl (DULCOLAX) Suppository 10 mg  Dose: 10 mg Freq: DAILY PRN Route: RE  PRN Reason: constipation  Start: 11/04/17 1424         1436 (10 mg)-Given            bupivacaine liposome (EXPAREL) LONG ACTING injection was administered into the infiltration site to produce postsurgical analgesia. Duration of  "action is up to 72 hours, and other \"sabra\" medications should not be given for 96 hours with the exception of the lidocaine 5% patch (LIDODERM) and the lidocaine 10mg in potassium infusions. This entry is for INFORMATION ONLY.  Freq: CONTINUOUS PRN Route: XX  Start: 11/02/17 1256   End: 11/06/17 1255   Admin Instructions: NURSE to notify physician if patient has ringing in the ears, metallic taste in the mouth, or circumoral numbness               cephALEXin (KEFLEX) capsule 500 mg  Dose: 500 mg Freq: DAILY Route: PO  Indications of Use: URINARY TRACT INFECTION  Indications Comment: UTI Prevention  Start: 11/03/17 0915        0925 (500 mg)-Given        0847 (500 mg)-Given        0836 (500 mg)-Given           diphenhydrAMINE (BENADRYL) solution 12.5 mg  Dose: 12.5 mg Freq: EVERY 6 HOURS PRN Route: PO  PRN Reason: itching  Start: 11/02/17 1256   Admin Instructions: Caution to be used when administering multiple CNS depressing meds within a short time frame.              Or  diphenhydrAMINE (BENADRYL) injection 12.5 mg  Dose: 12.5 mg Freq: EVERY 6 HOURS PRN Route: IV  PRN Reason: itching  PRN Comment: Only give if patient unable to take PO.  Start: 11/02/17 1256   Admin Instructions: Caution to be used when administering multiple CNS depressing meds within a short time frame.  For ordered doses up to 50 mg, give IV Push undiluted. Give each 25mg over a minimum of 1 minute. Extend in non-emergency               escitalopram (LEXAPRO) tablet 20 mg  Dose: 20 mg Freq: DAILY Route: PO  Start: 11/03/17 0800        0750 (20 mg)-Given        0848 (20 mg)-Given        0836 (20 mg)-Given           finasteride (PROSCAR) tablet 5 mg  Dose: 5 mg Freq: DAILY Route: PO  Start: 11/03/17 0800   Admin Instructions: *Do not handle tablets if you are pregnant*         0807 (5 mg)-Given        0848 (5 mg)-Given        0836 (5 mg)-Given           fluticasone furoate (ARNUITY ELLIPTA) 100 MCG/ACT inhalation powder 1 puff  Dose: 1 puff Freq: " "DAILY Route: IN  Start: 11/02/17 2000   Admin Instructions: Formulary alternate for Qvar 1 puff twice daily        2118-Hold [C]       2329 (1 puff)-Given        0753 (1 puff)-Given        0849 (1 puff)-Given        0840 (1 puff)-Given           lactated ringers infusion  Rate: 25 mL/hr Freq: CONTINUOUS Route: IV  Start: 11/02/17 0900   Admin Instructions: IF patient NOT on dialysis.        0943 ( )-New Bag       1039-Stopped       1122 ( )-Restarted       1147 ( )-Anesthesia Volume Adjustment       1208 ( )-Anesthesia Volume Adjustment       1228 ( )-Anesthesia Volume Adjustment       1253 ( )-Anesthesia Volume Adjustment              latanoprost (XALATAN) 0.005 % ophthalmic solution 1 drop  Dose: 1 drop Freq: AT BEDTIME Route: LEFT EYE  Start: 11/02/17 2200   Admin Instructions: Refrigerated product.        2327 (1 drop)-Given        2129 (1 drop)-Given        2136 (1 drop)-Given        [ ] 2200           levothyroxine (SYNTHROID/LEVOTHROID) half-tab 125 mcg  Dose: 125 mcg Freq: DAILY Route: PO  Start: 11/02/17 1800       1723 (125 mcg)-Given        0750 (125 mcg)-Given        0848 (125 mcg)-Given        0836 (125 mcg)-Given           lidocaine (LMX4) kit  Freq: EVERY 1 HOUR PRN Route: Top  PRN Reason: pain  PRN Comment: with VAD insertion or accessing implanted port.  Start: 11/02/17 1554   Admin Instructions: Do NOT give if patient has a history of allergy to any local anesthetic or any \"sabra\" product.   Apply 30 minutes prior to VAD insertion or port access.  MAX Dose:  2.5 g (  of 5 g tube)               lidocaine (LMX4) kit  Freq: EVERY 1 HOUR PRN Route: Top  PRN Reason: pain  PRN Comment: with VAD insertion or accessing implanted port.  Start: 11/02/17 0850   Admin Instructions: Do NOT give if patient has a history of allergy to any local anesthetic or any \"sabra\" product.   Apply 30 minutes prior to VAD insertion or port access.  MAX Dose:  2.5 g (  of 5 g tube)               lidocaine 1 % 1 mL  Dose: 1 mL " "Freq: EVERY 1 HOUR PRN Route: OTHER  PRN Comment: mild pain with VAD insertion or accessing implanted port  Start: 11/02/17 1554   Admin Instructions: Do NOT give if patient has a history of allergy to any local anesthetic or any \"sabra\" product. MAX dose 1 mL subcutaneous OR intradermal in divided doses.               melatonin tablet 1 mg  Dose: 1 mg Freq: AT BEDTIME PRN Route: PO  PRN Reason: sleep  Start: 11/03/17 2000   Admin Instructions: POD 1.  Do not give unless at least 6 hours of uninterrupted sleep is expected.          2241 (1 mg)-Given            metoclopramide (REGLAN) tablet 5 mg  Dose: 5 mg Freq: EVERY 6 HOURS PRN Route: PO  PRN Reasons: nausea,vomiting  Start: 11/02/17 1554   Admin Instructions: This is Step 3 of nausea and vomiting management.  Give if nausea not resolved 15 minutes after giving prochlorperazine (COMPAZINE).  If nausea not resolved in 15-30 minutes, Notify provider.              Or  metoclopramide (REGLAN) injection 5 mg  Dose: 5 mg Freq: EVERY 6 HOURS PRN Route: IV  PRN Reasons: nausea,vomiting  Start: 11/02/17 1554   Admin Instructions: This is Step 3 of nausea and vomiting management.  Give if nausea not resolved 15 minutes after giving prochlorperazine (COMPAZINE).  If nausea not resolved in 15-30 minutes, Notify provider.  Avoid use if patient has full bowel obstruction or perforation. Irritant. For ordered doses up to 10 mg, give IV Push undiluted over 2 minutes.               NaCl 0.9 % 8.95 mL with ropivacaine (NAROPIN) 200 mg, EPINEPHrine (ADRENALIN) 300 mcg, ketorolac (TORADOL) 15 mg, morphine 2.5 mg  Freq: ONCE Route: IX  Start: 11/02/17 1300                     naloxone (NARCAN) injection 0.1-0.4 mg  Dose: 0.1-0.4 mg Freq: EVERY 2 MIN PRN Route: IV  PRN Reason: opioid reversal  Start: 11/02/17 1554   Admin Instructions: For respiratory rate LESS than or EQUAL to 8.  Partial reversal dose:  0.1 mg titrated q 2 minutes for Analgesia Side Effects Monitoring Sedation Level " of 3 (frequently drowsy, arousable, drifts to sleep during conversation).Full reversal dose:  0.4 mg bolus for Analgesia Side Effects Monitoring Sedation Level of 4 (somnolent, minimal or no response to stimulation).  Give IV Push undiluted up to 2mg. Give each 0.4mg over 15 seconds in emergency situations. For non-emergent situations further dilute in 9mL of NS to facilitate titration of response.               ondansetron (ZOFRAN-ODT) ODT tab 4 mg  Dose: 4 mg Freq: EVERY 6 HOURS PRN Route: PO  PRN Reasons: nausea,vomiting  Start: 11/02/17 1554   Admin Instructions: This is Step 1 of nausea and vomiting management.  If nausea not resolved in 15 minutes, go to Step 2 prochlorperazine (COMPAZINE). Do not push through foil backing. Peel back foil and gently remove. Place on tongue immediately. Administration with liquid unnecessary              Or  ondansetron (ZOFRAN) injection 4 mg  Dose: 4 mg Freq: EVERY 6 HOURS PRN Route: IV  PRN Reasons: nausea,vomiting  Start: 11/02/17 1554   Admin Instructions: This is Step 1 of nausea and vomiting management.  If nausea not resolved in 15 minutes, go to Step 2 prochlorperazine (COMPAZINE).  Irritant. For ordered doses up to 4 mg, give IV Push undiluted over 2-5 minutes.               oxyCODONE IR (ROXICODONE) tablet 5-10 mg  Dose: 5-10 mg Freq: EVERY 3 HOURS PRN Route: PO  PRN Reason: moderate to severe pain  Start: 11/05/17 0800   Admin Instructions: Hold while on PCA or with regular IV opioid dosing.  IF CrCl UNKNOWN start at lowest end of dosing range.           1003 (10 mg)-Given           prochlorperazine (COMPAZINE) injection 5 mg  Dose: 5 mg Freq: EVERY 6 HOURS PRN Route: IV  PRN Reasons: nausea,vomiting  Start: 11/02/17 1554   Admin Instructions: This is Step 2 of nausea and vomiting management.   If nausea not resolved in 15 minutes, give metoclopramide (REGLAN) if ordered (step 3 of nausea and vomiting management)  For ordered doses up to 10 mg, give IV Push  undiluted. Each 5mg over 1 minute.              Or  prochlorperazine (COMPAZINE) tablet 5 mg  Dose: 5 mg Freq: EVERY 6 HOURS PRN Route: PO  PRN Reasons: nausea,vomiting  Start: 11/02/17 1554   Admin Instructions: This is Step 2 of nausea and vomiting management.   If nausea not resolved in 15 minutes, give metoclopramide (REGLAN) if ordered (step 3 of nausea and vomiting management)               senna-docusate (SENOKOT-S;PERICOLACE) 8.6-50 MG per tablet 1-2 tablet  Dose: 1-2 tablet Freq: 2 TIMES DAILY Route: PO  Start: 11/02/17 2000   Admin Instructions: Start with 1 tablet PO BID, If no bowel movement in 24 hours, increase to 2 tablets PO BID.  Hold for loose stools.        1952 (2 tablet)-Given        0750 (2 tablet)-Given       2023 (2 tablet)-Given        0848 (2 tablet)-Given       1946 (2 tablet)-Given        0837 (2 tablet)-Given       [ ] 2000           tamsulosin (FLOMAX) capsule 0.4 mg  Dose: 0.4 mg Freq: DAILY Route: PO  Start: 11/03/17 0800   Admin Instructions: Administer 30 minutes after the same meal each day.  Capsules should be swallowed whole; do not crush chew or open.         (0925)-Not Given [C]        0848 (0.4 mg)-Given        0836 (0.4 mg)-Given          Completed Medications  Medications 10/30/17 10/31/17 11/01/17 11/02/17 11/03/17 11/04/17 11/05/17         Dose: 500 mL Freq: ONCE Route: IV  Start: 11/03/17 1200   End: 11/03/17 1157        1157 (500 mL)-New Bag               Dose: 500 mL Freq: ONCE Route: IV  Last Dose: 500 mL (11/03/17 0347)  Start: 11/03/17 0300   End: 11/03/17 0547        0347 (500 mL)-New Bag               Dose: 500 mL Freq: ONCE Route: IV  Last Dose: 500 mL (11/02/17 1952)  Start: 11/02/17 1945   End: 11/02/17 2152 1952 (500 mL)-New Bag                Dose: 975 mg Freq: EVERY 8 HOURS Route: PO  Start: 11/02/17 1730   End: 11/05/17 0836   Admin Instructions: Do not use if patient has an active opioid/acetaminophen analgesic order for pain  Maximum acetaminophen  dose from all sources = 75 mg/kg/day not to exceed 4 grams/day.        1723 (975 mg)-Given        0115 (975 mg)-Given       0925 (975 mg)-Given       1736 (975 mg)-Given        0138 (975 mg)-Given       0849 (975 mg)-Given       1717 (975 mg)-Given       2335 (975 mg)-Given        0836 (975 mg)-Given             Dose: 900 mg Freq: EVERY 8 HOURS Route: IV  Indications of Use: PERIOPERATIVE PHARMACOPROPHYLAXIS  Last Dose: 900 mg (11/03/17 0232)  Start: 11/02/17 1830   End: 11/03/17 0332   Admin Instructions: First post-op dose due 8 hours after intra-op dose, see eMAR.        1813 (900 mg)-New Bag        0232 (900 mg)-New Bag               Start: 11/02/17 1721   End: 11/02/17 1723   Admin Instructions: Jenna Ennis : cabinet override        1723 ( )-New Bag             Discontinued Medications  Medications 10/30/17 10/31/17 11/01/17 11/02/17 11/03/17 11/04/17 11/05/17         Freq: PRN  Start: 11/02/17 1101   End: 11/02/17 1549       1101 (10 mL)-Given       1549-Med Discontinued            Dose: 900 mg Freq: SEE ADMIN INSTRUCTIONS Route: IV  Indications of Use: PERIOPERATIVE PHARMACOPROPHYLAXIS  Start: 11/02/17 0847   End: 11/02/17 1605   Admin Instructions: Intra-Op Dose.  Give every 6 hours while patient in surgery, starting 6 hours after pre-op dose.  DO NOT GIVE intra-op dose if CrCl less than 10 mL/min (on dialysis).  If CrCL less than 50 mL/min, double the time interval between doses.        1605-Med Discontinued            Dose: 25-50 mcg Freq: EVERY 2 MIN PRN Route: IV  PRN Reason: other  PRN Comment: acute pain  Start: 11/02/17 1255   End: 11/02/17 1549   Admin Instructions: MAX cumulative dose = 250 mcg.    Use Fentanyl initially, as a short acting agent for acute pain control.  If insufficient, or a longer acting agent is needed, begin Morphine or Hydromorphone if ordered.  Give IV Push undiluted over a minimum of 3-5 minutes up to 100 mcg.        1549-Med Discontinued            Dose: 25-50 mcg Freq:  EVERY 2 MIN PRN Route: IV  PRN Reason: other  PRN Comment: acute pain. Max cumulative dose 250 mcg.   Start: 11/02/17 0847   End: 11/02/17 1606   Admin Instructions: Caution: may have synergistic effect when used with midazolam (VERSED). If inadequate response may repeat 25-50 mcg IV slowly Q 5 minutes PRN pain (Maximum of 200 mcg total dose in 60 minutes.) Doses can be exceeded under direct oversight of patient by provider.Only given for procedural sedation while provider present. Nurse to discontinue this medication when procedure complete.  Give IV Push undiluted over a minimum of 3-5 minutes up to 100 mcg.        1001 (25 mcg)-Given       1020 (30 mcg)-Given       1606-Med Discontinued            Dose: 0.2 mg Freq: EVERY 1 MIN PRN Route: IV  PRN Reason: benzodiazepine reversal  Start: 11/02/17 0847   End: 11/02/17 1606   Admin Instructions: Give over 15 seconds. If inadequate response after 45 seconds, may repeat up to a MAX total dose of 1 mg. Continue monitoring until discharge criteria met of minimum of 2 hours.  Irritant. Give IV Push undiluted up to 1 mg. Each 0.2mg over 15 seconds.        1606-Med Discontinued            Dose: 100 mg Freq: PRE-OP/PRE-PROCEDURE Route: PO  Start: 11/02/17 0847   End: 11/02/17 1605   Admin Instructions: For pain with neuropathic features        1605-Med Discontinued            Dose: 0.3-0.5 mg Freq: EVERY 5 MIN PRN Route: IV  PRN Reason: other  PRN Comment: acute pain.  May administer if Respiratory Rate is greater than 10  Start: 11/02/17 1255   End: 11/02/17 1549   Admin Instructions: If fentanyl is also ordered, use HYDROmorphone if pain control insufficient with fentanyl or a longer acting agent is needed.   Max cumulative dose = 2 mg        1325 (0.5 mg)-Given       1338 (0.5 mg)-Given       1500 (0.5 mg)-Given       1549-Med Discontinued            Dose: 10 mg Freq: ONCE PRN Route: IV  PRN Reason: high blood pressure  PRN Comment: for Systemic Blood Pressure greater  "than 160 and Heart Rate greater than 60 bpm.    Start: 11/02/17 1255   End: 11/02/17 1549   Admin Instructions: For PACU USE ONLY.  DC WHEN TRANSFERRED TO FLOOR.  For ordered doses up to 80 mg, give IV Push undiluted. Give each 20 mg over 2 minutes.        1549-Med Discontinued            Rate: 100 mL/hr Freq: CONTINUOUS Route: IV  Start: 11/02/17 1300   End: 11/02/17 1549   Admin Instructions: Continue until IV catheter is weaned               1549-Med Discontinued            Dose: 1 mL Freq: EVERY 1 HOUR PRN Route: OTHER  PRN Comment: mild pain with VAD insertion or accessing implanted port  Start: 11/02/17 0850   End: 11/02/17 1606   Admin Instructions: Do NOT give if patient has a history of allergy to any local anesthetic or any \"sabra\" product. MAX dose 1 mL subcutaneous OR intradermal in divided doses.        1606-Med Discontinued            Dose: 1-2 mg Freq: EVERY 4 MIN PRN Route: IV  PRN Reason: sedation  Start: 11/02/17 0847   End: 11/02/17 1606   Admin Instructions: Caution: when used with opioids, may need lower doses. If inadequate response may repeat 1 mg IV slowly Q 4 minutes PRN sedation until desired response (Maximum of 5 mg total dose.) Doses can be exceeded under direct oversight of patient by provider. Nurse to discontinue this medication when procedure complete.  Give up to 2.5 mg IV Push slowly titrated over a minimum of 2 minutes. Dilute each 1mg in 4mL of NS.        1606-Med Discontinued            Dose: 0.1-0.4 mg Freq: EVERY 2 MIN PRN Route: IV  PRN Reason: opioid reversal  Start: 11/02/17 0847   End: 11/02/17 1606   Admin Instructions: For apnea or imminent respiratory arrest: give 0.4 mg IV undiluted Q 2 minutes PRN until desired degree of reversal is obtained, stop opioid and notify provider. Continue monitoring until discharge are criteria met for a minimum of 2 hours. For severe sedation, decrease in respiratory depth, quality or Respiratory Rate less than 8: give 0.1 mg IV Q 2 " minutes x 3 doses, stop opioid and notify provider.  Try to minimize reversal of analgesia especially in end-of-life patients.  Continue monitoring until discharge criteria are met for a minimum of 2 hours.  Give IV Push undiluted up to 2mg. Give each 0.4mg over 15 seconds in emergency situations. For non-emergent situations further dilute in 9mL of NS to facilitate titration of response.        1606-Med Discontinued            Dose: 4 mg Freq: EVERY 30 MIN PRN Route: PO  PRN Reason: nausea  Start: 11/02/17 1255   End: 11/02/17 1549   Admin Instructions: MAX total dose = 8 mg, including OR dosing. If not resolved in 15 minutes, then go to step 2 (Prochlorperazine if ordered).        1549-Med Discontinued         Or    Dose: 4 mg Freq: EVERY 30 MIN PRN Route: IV  PRN Reason: nausea  Start: 11/02/17 1255   End: 11/02/17 1549   Admin Instructions: MAX total dose = 8 mg, including OR dosing. If not resolved in 15 minutes, then go to step 2 (Prochlorperazine if ordered).  Irritant. For ordered doses up to 4 mg, give IV Push undiluted over 2-5 minutes.        1549-Med Discontinued            Dose: 5-10 mg Freq: EVERY 4 HOURS PRN Route: PO  PRN Reason: moderate to severe pain  Start: 11/02/17 1256   End: 11/05/17 0756   Admin Instructions: Hold while on PCA or with regular IV opioid dosing.  IF CrCl UNKNOWN start at lowest end of dosing range.        2210 (5 mg)-Given        0232 (5 mg)-Given       0507 (5 mg)-Given       0925 (5 mg)-Given       1326 (5 mg)-Given       1744 (5 mg)-Given       2129 (10 mg)-Given        0138 (10 mg)-Given       0628 (10 mg)-Given       1041 (10 mg)-Given       1441 (10 mg)-Given       1946 (10 mg)-Given       2332 (10 mg)-Given        0230 (10 mg)-Given [C]       0626 (10 mg)-Given       0756-Med Discontinued         Rate: 2.84-28.38 mL/hr Freq: CONTINUOUS Route: IV  Start: 11/02/17 1200   End: 11/02/17 1549   Admin Instructions: Presciber to titrate intra-op.   Current dose =      mcg/kg/min  Vesicant.               1549-Med Discontinued            Dose: 5 mg Freq: EVERY 6 HOURS PRN Route: IV  PRN Reasons: nausea,vomiting  Start: 11/02/17 1255   End: 11/02/17 1549   Admin Instructions: This is Step 2 of the nausea and vomiting protocol.   If nausea not resolved in 15 minutes, give Metoclopramide if ordered (step 3 of nausea and vomiting protocol)    For ordered doses up to 10 mg, give IV Push undiluted. Each 5mg over 1 minute.        1549-Med Discontinued            Freq: PRN  Start: 11/02/17 1138   End: 11/02/17 1549       1138 ( )-Given [C]       1139 ( )-Given [C]       1549-Med Discontinued            Dose: 3 mL Freq: EVERY 8 HOURS Route: IK  Start: 11/02/17 1600   End: 11/05/17 0747   Admin Instructions: And Q1H PRN, to lock peripheral IV dormant line.        (1646)-Not Given       (2323)-Not Given        (0757)-Not Given               (0001)-Not Given       (0851)-Not Given       (1718)-Not Given               0747-Med Discontinued         Dose: 3 mL Freq: EVERY 1 HOUR PRN Route: IK  PRN Reason: line flush  PRN Comment: for peripheral IV flush post IV meds  Start: 11/02/17 1554   End: 11/05/17 0746         1044 (3 mL)-Given        0746-Med Discontinued         Dose: 3 mL Freq: EVERY 8 HOURS Route: IK  Start: 11/02/17 0900   End: 11/05/17 0746   Admin Instructions: And Q1H PRN, to lock peripheral IV dormant line.        (1646)-Not Given               (0232)-Not Given       (0811)-Not Given               (0001)-Not Given       (0851)-Not Given       (1719)-Not Given               0746-Med Discontinued         Dose: 3 mL Freq: EVERY 1 HOUR PRN Route: IK  PRN Reason: line flush  PRN Comment: for peripheral IV flush post IV meds  Start: 11/02/17 0850   End: 11/05/17 0745          0745-Med Discontinued         Freq: PRN  Start: 11/02/17 1222   End: 11/02/17 1549       1222 (1,400 mL)-Given       1549-Med Discontinued            Freq: PRN  Start: 11/02/17 1120   End: 11/02/17 1549       1120  (150 mL)-Given       1549-Med Discontinued       Medications 10/30/17 10/31/17 11/01/17 11/02/17 11/03/17 11/04/17 11/05/17

## 2017-11-02 NOTE — IP AVS SNAPSHOT
` `           UR 8A: [phone]                                              INTERAGENCY TRANSFER FORM - NURSING   2017                    Hospital Admission Date: 2017  JANICE MCCOY   : 1945  Sex: Male        Attending Provider: Karlos Hill MD     Allergies:  Penicillins    Infection:  None   Service:  ORTHOPEDICS    Ht:  1.829 m (6')   Wt:  94.6 kg (208 lb 8.9 oz)   Admission Wt:  94.6 kg (208 lb 8.9 oz)    BMI:  28.29 kg/m 2   BSA:  2.19 m 2            Patient PCP Information     Provider PCP Type    [facility]      Current Code Status     Date Active Code Status Order ID Comments User Context       2017  3:54 PM Full Code [id number]  Nikko Germain MD Inpatient       Code Status History     Date Active Date Inactive Code Status Order ID Comments User Context    This patient has a current code status but no historical code status.      Advance Directives        Does patient have a scanned Advance Directive/ACP document in EPIC?           Yes        Hospital Problems as of 2017              Priority Class Noted POA    Status post knee surgery Medium  2017 Yes      Non-Hospital Problems as of 2017              Priority Class Noted    Primary osteoarthritis of left knee Medium  2016    History of above knee amputation, right (H) Medium  2016      Immunizations     None         END      ASSESSMENT     Discharge Profile Flowsheet     DISCHARGE NEEDS ASSESSMENT     Inspection of bony prominences  Full 17 0904    Equipment Currently Used at Home  walker, standard 17 0946   Full except areas not inspected   -- (UTV under dressing) 17 0545    Transportation Available  car;family or friend will provide 17 0946   Inspection under devices  Full except (identify device(s) not inspected) (UTV under incisional dressing.) 17 1011    GASTROINTESTINAL (ADULT,PEDIATRIC,OB)     Skin WDL  ex 17 0904    GI WDL  WDL  "11/05/17 0904   Skin Integrity  incision(s);wound(s) 11/05/17 0904    Last Bowel Movement  11/04/17 11/05/17 0904   SAFETY      Passing flatus  yes 11/04/17 1805   Safety WDL  WDL 11/05/17 0904    COMMUNICATION ASSESSMENT     Safety Factors  bed in low position;wheels locked;call light in reach;upper side rails raised x 2;ID band on 11/05/17 0904    Patient's communication style  spoken language (English or Bilingual) 10/27/17 1608   All Alarms  none present 11/05/17 0904    SKIN                        Assessment WDL (Within Defined Limits) Definitions           Safety WDL     Effective: 09/28/15    Row Information: <b>WDL Definition:</b> Bed in low position, wheels locked; call light in reach; upper side rails up x 2; ID band on<br> <font color=\"gray\"><i>Item=AS safety wdl>>List=AS safety wdl>>Version=F14</i></font>      Skin WDL     Effective: 09/28/15    Row Information: <b>WDL Definition:</b> Warm; dry; intact; elastic; without discoloration; pressure points without redness<br> <font color=\"gray\"><i>Item=AS skin wdl>>List=AS skin wdl>>Version=F14</i></font>      Vitals     Vital Signs Flowsheet     VITAL SIGNS     Comfort  tolerable with discomfort 11/05/17 1004    Temp  96.5  F (35.8  C) 11/05/17 0859   Change in Pain  getting better 11/05/17 0857    Temp src  Axillary 11/05/17 0859   Pain Control  partially effective 11/05/17 0857    Resp  16 11/05/17 0859   Functioning  can do most things, but pain gets in the way of some 11/05/17 0857    Pulse  97 11/04/17 1440   Sleep  normal sleep 11/05/17 0503    Heart Rate  89 11/05/17 0859   ANALGESIA SIDE EFFECTS MONITORING      Pulse/Heart Rate Source  Monitor 11/05/17 0859   Side Effects Monitoring: Respiratory Quality  R 11/05/17 0857    BP  134/68 11/05/17 0859   Side Effects Monitoring: Respiratory Depth  N 11/05/17 0857    BP Location  Left arm 11/05/17 0859   Side Effects Monitoring: Sedation Level  1 11/05/17 0857    Patient Position  Sitting 11/02/17 0911   " HEIGHT AND WEIGHT      OXYGEN THERAPY     Height  1.829 m (6') 11/02/17 0852    SpO2  95 % 11/05/17 0859   Weight  94.6 kg (208 lb 8.9 oz) 11/02/17 0852    O2 Device  None (Room air) 11/05/17 0859   BSA (Calculated - sq m)  2.19 11/02/17 0852    Oxygen Delivery  1/2 LPM 11/03/17 0431   BMI (Calculated)  28.34 11/02/17 0852    RESPIRATORY MONITORING     POSITIONING      Respiratory Monitoring (EtCO2)  36 mmHg 11/03/17 1157   Body Position  supine, head elevated 11/05/17 0904    Integrated Pulmonary Index (IPI)  8-9 11/03/17 1136   Head of Bed (HOB)  HOB at 20-30 degrees 11/04/17 1805    PAIN/COMFORT     Positioning/Transfer Devices  pillows;in use 11/03/17 1011    Patient Currently in Pain  sleeping: patient not able to self report 11/05/17 0855   ECG      Preferred Pain Scale  CAPA (Clinically Aligned Pain Assessment) ([facility] Adults Only) 11/05/17 0503   ECG Rhythm  Sinus rhythm 11/02/17 1555    Pain Location  Knee 11/05/17 0503   Ectopy  None 11/02/17 1337    Pain Orientation  Left 11/05/17 0503   Lead Monitored  Lead II 11/02/17 1337    Pain Descriptors  Aching 11/05/17 0503   DAILY CARE      Pain Management Interventions  analgesia administered 11/05/17 0503   Activity Management  activity adjusted per tolerance 11/05/17 0904    Pain Intervention(s)  Medication (See eMAR) 11/05/17 0503   Activity Assistance Provided  assistance, 1 person 11/05/17 0904    CLINICALLY ALIGNED PAIN ASSESSMENT (CAPA) ([facility] ADULTS ONLY)                   Patient Lines/Drains/Airways Status    Active LINES/DRAINS/AIRWAYS     Name: Placement date: Placement time: Site: Days: Last dressing change:    External Catheter 11/04/17 1100 11/04/17   1100    1     Wound 11/04/17 Upper;Right Leg Abrasion(s) 11/04/17   0000   Leg   1     Incision/Surgical Site 11/02/17 Left Knee 11/02/17   1250    3             Patient Lines/Drains/Airways Status    Active PICC/CVC     None            Intake/Output Detail  Report     Date Intake     Output   Net    Shift P.O. I.V. IV Piggyback Total Urine Drains Total       Day 11/04/17 0000 - 11/04/17 0659 -- -- -- -- 2100 -- 2100 -2100    Krissy 11/04/17 0700 - 11/04/17 1459 880 0 -- 880 700 -- 700 180    Noc 11/04/17 1500 - 11/04/17 2359 -- -- -- -- 275 -- 275 -275    Day 11/05/17 0000 - 11/05/17 0659 -- -- -- -- -- -- -- 0    Krissy 11/05/17 0700 - 11/05/17 1459 -- -- -- -- 900 -- 900 -900      Case Management/Discharge Planning     Case Management/Discharge Planning Flowsheet     LIVING ENVIRONMENT     Equipment Currently Used at Home  walker, standard 11/03/17 0946    Lives With  spouse 11/03/17 0946   ABUSE RISK SCREEN      Living Arrangements  house 11/03/17 0946   QUESTION TO PATIENT:  Has a member of your family or a partner(now or in the past) intimidated, hurt, manipulated, or controlled you in any way?  no 11/02/17 0914    COPING/STRESS     QUESTION TO PATIENT: Do you feel safe going back to the place where you are living?  yes 11/02/17 0914    Major Change/Loss/Stressor  hospitalization;surgery/procedure 10/27/17 1630   OBSERVATION: Is there reason to believe there has been maltreatment of a vulnerable adult (ie. Physical/Sexual/Emotional abuse, self neglect, lack of adequate food, shelter, medical care, or financial exploitation)?  no 11/02/17 0914    DISCHARGE PLANNING     (R) MENTAL HEALTH SUICIDE RISK      Transportation Available  car;family or friend will provide 11/03/17 0946   Are you depressed or being treated for depression?  Yes 11/04/17 2040    FINAL RESOURCES

## 2017-11-02 NOTE — IP AVS SNAPSHOT
` ` Patient Information     Patient Name Sex Palomo Fournier ([id number]) Male 1945       Room Bed    [facility] 08-      Patient Demographics     Address Phone E-mail Address    [address] [address] [phone] (Home) *Preferred*  [phone] (Work)  [phone] (Mobile) [email or web address]      Patient Ethnicity & Race     Ethnic Group Patient Race    American White      Emergency Contact(s)     Name Relation Home Work Mobile    Ema Zapata Spouse [phone] none [phone]      Documents on File        Status Date Received Description       Documents for the Patient    Face Sheet Received () 08/14/10     HIM BROWN Authorization  08/10/12 [facility] dated 2012    Privacy Notice - [place] Received 13     Insurance Card Received 16     External Medication Information Consent       Patient ID Received 16     Consent for Services - Hospital/Clinic Received () 13     Consent for EHR Access  13 Copied from existing Consent for services - C/HOD collected on 2013    Consent for Services - UM       Consent for Services/Privacy Notice - Hospital/Clinic-Esign Received () 16     [facility] Specified Other       Consent for Services/Privacy Notice - Hospital/Clinic Received 17 CONSENT    HIM BROWN Authorization - File Only  16 JESSI HANSEN MD/[facility]    Care Everywhere Prospective Auth Received 17     Insurance Card Received 17 BCBS    Advance Directives and Living Will Received 17 Health Care Directive 2005    Advance Directives and Living Will Not Received  Validation of AD 2005       Documents for the Encounter    Business/Insurance/Care Coordination/Health Form - Encounter  17 ORTHO SURGERY SCHEDULES    H/P - History and Physical  10/19/17 [facility] HISTORY AND PHYSICAL 10/18/17    CMS IM for Patient Signature Received 17 DATE/TIME    EKG Cardiac  - HIM Scan  10/19/17 EKG [facility]      Admission Information     Attending Provider Admitting Provider Admission Type Admission Date/Time    Karlos Hill MD Dahl, Mark Thomas, MD Elective 11/02/17  0843    Discharge Date Hospital Service Auth/Cert Status Service Area     Orthopedics Incomplete [facility]    Unit Room/Bed Admission Status       UR 8A 0824/0824-01 Admission (Confirmed)       Admission     Complaint    Osteoarthritis Left Knee , Status post knee surgery      Hospital Account     Name Acct ID Class Status Primary Coverage    Palomo Zapata [id number] Inpatient Open MEDICARE - MEDICARE FOR HB SUPPLEMENT            Guarantor Account (for Hospital Account #[id number])     Name Relation to Pt Service Area Active? Acct Type    Palomo Zapata Self FCS Yes Personal/Family    Address Phone          [address] [place] [address] [phone](H)  [phone](O)              Coverage Information (for Hospital Account #[id number])     1. MEDICARE/MEDICARE FOR HB SUPPLEMENT     F/O Payor/Plan Precert #    MEDICARE/MEDICARE FOR HB SUPPLEMENT     Subscriber Subscriber #    Palomo Zapata [id number]    Address Phone    [employer] CLAIMS  [address]  [place] [address] [phone]          2. BCBS/BCBS PLATINUM BLUE     F/O Payor/Plan Precert #    BCBS/BCBS PLATINUM BLUE     Subscriber Subscriber #    Palomo Zapata [id number]    Address Phone    [address] [address]  [place], [place] [id number] [phone]

## 2017-11-02 NOTE — ANESTHESIA CARE TRANSFER NOTE
Patient: Palomo Zapata    Procedure(s):  Left Total Knee Arthroplasty   - Wound Class: I-Clean    Diagnosis: Osteoarthritis Left Knee   Diagnosis Additional Information: No value filed.    Anesthesia Type:   Spinal, Periph. Nerve Block for postop pain     Note:  Airway :Face Mask  Patient transferred to:PACU  Comments: Airway :Face Mask  Patient transferred to:PACU  Comments: Prior to extubation, patient was breathing spontaneously with appropriate respiratory rate and tidal volume. The patient was following commands, warm and demonstrated adequate strength. Patient was suctioned and extubated without complication.   Transported to PACU on 6L O2 via face mask.   VSS upon arrival to PACU.  Patient denies nausea or pain at this time.   Care transfer plan communicated and patient care transferred to PACU RN     Sukhjinder Littlejohn Jr., MD  Anesthesia Resident - [facility]  Pager: [phone]  11/2/2017  1:08 PM  Handoff Report: Identifed the Patient, Identified the Reponsible Provider, Reviewed the pertinent medical history, Discussed the surgical course, Reviewed Intra-OP anesthesia mangement and issues during anesthesia, Set expectations for post-procedure period and Allowed opportunity for questions and acknowledgement of understanding      Vitals: (Last set prior to Anesthesia Care Transfer)    CRNA VITALS  11/2/2017 1228 - 11/2/2017 1308      11/2/2017             Pulse: 101    Ht Rate: 101    SpO2: (!)  83 %                Electronically Signed By: Sukhjinder Littlejohn MD  November 2, 2017  1:08 PM

## 2017-11-02 NOTE — OR NURSING
PACU to Inpatient Nursing Handoff    Patient Palomo Zapata is a 72 year old male who speaks English.   Procedure Procedure(s):  Left Total Knee Arthroplasty   - Wound Class: I-Clean   Surgeon(s) Primary: Karlos Hill MD  Assisting: Andrei Marin PA-C  Resident - Assisting: Nikko Germain MD     Allergies   Allergen Reactions     Penicillins      Palomo does not remember what the reaction was but it was said he is allergic       Isolation  [unfilled]    Past Medical History   has a past medical history of Above knee amputation of right lower extremity (H); Anxiety; Cancer (H); Complication of anesthesia; Depressive disorder; Thyroid disease; and Uncomplicated asthma.    Anesthesia Spinal   Dermatome Level Dermatomes Left: L5  Dermatomes Right: L2   Preop Meds acetaminophen (Tylenol) - time given: 0920  celecoxib (Celebrex) - time given: 0921  gabapentin (Neurontin) - time given: 0920   Nerve block Adductor canal.  Location:left. Med:Exparel (liposomal bupivacaine). Time given: 1004   Intraop Meds ondansetron (Zofran): last given at 1235   Local Meds Yes - Local Cocktail (morphine, ropivacaine, epinephrine, Toradol) - time given: 1030   Antibiotics clindamycin (Cleocin) - last given at 1030     Pain Patient Currently in Pain: yes  Comfort: tolerable with discomfort  Pain Control: partially effective   PACU meds  Dilaudid 1mg last given 1335     PCA / epidural No   Capnography  yes   Telemetry ECG Rhythm: Sinus rhythm      Labs Glucose Lab Results   Component Value Date    GLC 98 11/02/2017       Hgb Lab Results   Component Value Date    HGB 12.2 07/03/2016       INR Lab Results   Component Value Date    INR 1.17 08/15/2010      PACU Imaging Completed     Wound/Incision Incision/Surgical Site 11/02/17 Left Knee (Active)   Incision Assessment WDL 11/2/2017  1:49 PM   Closure Adhesive strip(s) 11/2/2017 12:50 PM   Dressing Intervention Clean, dry, intact 11/2/2017  1:49 PM   Number of days:0      CMS  Peripheral Neurovascular WDL:  WDL except (11/02/17 0914)  LLE Temperature: warm (11/02/17 0914)  LLE Color: no discoloration (11/02/17 0914)  LLE Sensation: no tingling;no numbness (11/02/17 0914)   Equipment ice pack and continuous passive motion machine (CPM)   Other LDA  hemovac     IV Access Peripheral IV 11/02/17 Right Hand (Active)   Site Assessment WDL 11/2/2017  1:49 PM   Line Status Infusing 11/2/2017  1:00 PM   Phlebitis Scale 0-->no symptoms 11/2/2017  1:00 PM   Number of days:0      Blood Products Not applicable EBL surg log * No blood loss amount entered *   Intake/Output Date 11/02/17 0700 - 11/03/17 0659   Shift 8366-5989 7071-7499 9474-5491 24 Hour Total   I  N  T  A  K  E   P.O. 120   120    I.V. 1300.03   1300.03    Shift Total  (mL/kg) 1420.03  (15.01)   1420.03  (15.01)   O  U  T  P  U  T   Urine 350   350    Drains 0   0    Shift Total  (mL/kg) 350  (3.7)   350  (3.7)   Weight (kg) 94.6 94.6 94.6 94.6        Drains / [place] Closed/Suction Drain Left Knee Accordion 10 Belarusian (Active)   Output (ml) 0 ml 11/2/2017  1:48 PM   Number of days:0       Urethral Catheter Latex;Straight-tip 16 fr (Active)   Urine Output 100 mL 11/2/2017  1:48 PM   Number of days:0      Time of void PreOp Void Prior to Procedure: 0900 (11/02/17 0852)    PostOp     Bladder Scan      mL (11/02/17 1348)  tolerating sips     Vitals    B/P: 111/59  T: 97.9  F (36.6  C)    Temp src: Axillary  P:       Heart Rate: 98 (11/02/17 1345)     R: 10  O2:  SpO2: 98 %    O2 Device: Nasal cannula (11/02/17 1345)    Oxygen Delivery: 2 LPM (11/02/17 1345)         Family/support present yes   Patient belongings Patient Belongings: clothing;shoes (prosthetic leg)  Disposition of Belongings:  (labelled and secured )   Patient transported on bed   DC meds/scripts (obs/outpt) Not applicable     Special needs/considerations R below knee amputation, has prostetic leg   Tasks needing completion None       Chano Rey, RN  ASCOM [id number]

## 2017-11-02 NOTE — ANESTHESIA PROCEDURE NOTES
Peripheral Nerve Block Procedure Note    Staff:     Anesthesiologist:  JENIFER BENITEZ    Resident/CRNA:  BOUBACAR TODD    Block performed by resident/CRNA in the presence of a teaching physician    Location: Pre-op  Procedure Start/Stop TImes:      11/2/2017 10:04 AM     11/2/2017 10:07 AM    patient identified, IV checked, site marked, risks and benefits discussed, informed consent, monitors and equipment checked, pre-op evaluation, at physician/surgeon's request and post-op pain management      Correct Patient: Yes      Correct Position: Yes      Correct Site: Yes      Correct Procedure: Yes      Correct Laterality:  Yes    Site Marked:  Yes  Procedure details:     Procedure:  Adductor canal    ASA:  3    Laterality:  Left    Position:  Supine    Sterile Prep: chloraprep      Needle:  Insulated    Needle gauge:  21    Needle length (mm):  110    Ultrasound: Yes      Ultrasound used to identify targeted nerve, plexus, or vascular structure and placed a needle adjacent to it      Permanent Image entered into patiient's record      Abnormal pain on injection: No      Blood Aspirated: No      Paresthesias:  No    Bleeding at site: No      Test dose negative for signs of intravascular injection: Yes      Bolus via:  Needle    Infusion Method:  Single Shot    Complications:  None  Assessment/Narrative:     Injection made incrementally with aspirations every (mL):  5

## 2017-11-02 NOTE — CONSULTS
"HOSPITALIST INITIAL CONSULT NOTE    Referring Provider:  Karlos Hill MD      Reason for Consult         History of Present Illness     Palomo Zapata is 72 year old year old male with hx of Hypothyroidism, Hyperlipidemia,Bladder and Prostate cancer, Depression is being admitted s/p Left TKA on 11/02/2017  EBL    ml.   Currently, reports pain is controlled. Denies any nausea, vomiting, chest pain, shortness of breath, lightheadedness or dizziness.             Past Medical History     Past Medical History:   Diagnosis Date     Above knee amputation of right lower extremity (H)      Anxiety      Cancer (H)      Complication of anesthesia     BP low after spinal      Depressive disorder      Thyroid disease      Uncomplicated asthma           Past Surgical History     Past Surgical History:   Procedure Laterality Date     GENITOURINARY SURGERY       ORTHOPEDIC SURGERY            Medications     All his current medications are reviewed in current medication section of [facility].  Home medications are reviewed.  Current Facility-Administered Medications   Medication     NaCl 0.9 % 8.95 mL with ropivacaine (NAROPIN) 200 mg, EPINEPHrine (ADRENALIN) 300 mcg, ketorolac (TORADOL) 15 mg, morphine 2.5 mg     lidocaine (LMX4) kit     sodium chloride (PF) 0.9% PF flush 3 mL     sodium chloride (PF) 0.9% PF flush 3 mL     lactated ringers infusion     bupivacaine liposome (EXPAREL) LONG ACTING injection was administered into the infiltration site to produce postsurgical analgesia. Duration of action is up to 72 hours, and other \"sabra\" medications should not be given for 96 hours with the exception of the lidocaine 5% patch (LIDODERM) and the lidocaine 10mg in potassium infusions. This entry is for INFORMATION ONLY.     atorvastatin (LIPITOR) tablet 10 mg     fluticasone furoate (ARNUITY ELLIPTA) 100 MCG/ACT inhalation powder 1 puff     [START ON 11/3/2017] finasteride (PROSCAR) tablet 5 mg     [START ON 11/3/2017] " tamsulosin (FLOMAX) capsule 0.4 mg     lidocaine 1 % 1 mL     lidocaine (LMX4) kit     sodium chloride (PF) 0.9% PF flush 3 mL     sodium chloride (PF) 0.9% PF flush 3 mL     0.9% sodium chloride infusion     benzocaine-menthol (CEPACOL) 15-3.6 MG lozenge 1-2 lozenge     naloxone (NARCAN) injection 0.1-0.4 mg     clindamycin (CLEOCIN) infusion 900 mg     acetaminophen (TYLENOL) tablet 975 mg     [START ON 11/5/2017] acetaminophen (TYLENOL) tablet 650 mg     oxyCODONE IR (ROXICODONE) tablet 5-10 mg     diphenhydrAMINE (BENADRYL) solution 12.5 mg    Or     diphenhydrAMINE (BENADRYL) injection 12.5 mg     ondansetron (ZOFRAN-ODT) ODT tab 4 mg    Or     ondansetron (ZOFRAN) injection 4 mg     prochlorperazine (COMPAZINE) injection 5 mg    Or     prochlorperazine (COMPAZINE) tablet 5 mg     metoclopramide (REGLAN) tablet 5 mg    Or     metoclopramide (REGLAN) injection 5 mg     senna-docusate (SENOKOT-S;PERICOLACE) 8.6-50 MG per tablet 1-2 tablet     [START ON 11/3/2017] melatonin tablet 1 mg        Allergies        Allergies   Allergen Reactions     Penicillins      Palomo does not remember what the reaction was but it was said he is allergic        Family History     History reviewed. No pertinent family history.       Social History     Social History     Social History     Marital status:      Spouse name: N/A     Number of children: N/A     Years of education: N/A     Occupational History     Not on file.     Social History Main Topics     Smoking status: Former Smoker     Quit date: 10/27/1988     Smokeless tobacco: Never Used     Alcohol use No     Drug use: No     Sexual activity: Not on file     Other Topics Concern     Not on file     Social History Narrative        Review of Systems   A 10 point review of systems was taken and largely negative except for that which was stated above.      Physical Exam       Vital signs:    Blood pressure 101/53, temperature 97.6  F (36.4  C), temperature source Oral,  resp. rate 16, height 1.829 m (6'), weight 94.6 kg (208 lb 8.9 oz), SpO2 94 %.  Estimated body mass index is 28.29 kg/(m^2) as calculated from the following:    Height as of this encounter: 1.829 m (6').    Weight as of this encounter: 94.6 kg (208 lb 8.9 oz).      Intake/Output Summary (Last 24 hours) at 11/02/17 1631  Last data filed at 11/02/17 1513   Gross per 24 hour   Intake          1620.03 ml   Output              550 ml   Net          1070.03 ml      HEENT: No icterus, no pallor  Cardiovascular: S1, S2 normal.   Respiratory: B/L CTA  Abdomen: Soft, NT, BS+  Neurology: Intermittently falls asleep. Conversant. No tremors.  Extremities: Left knee dressing in place.        Laboratory and Imaging Studies     Laboratory and Imaging studies reviewed in the results review section of Epic. Pertinent studies are as below:    CMP  Recent Labs  Lab 11/02/17  0909   POTASSIUM 4.0   GLC 98   CR 1.06   GFRESTIMATED 68   GFRESTBLACK 82     CBCNo lab results found in last 7 days.  INRNo lab results found in last 7 days.  Arterial Blood GasNo lab results found in last 7 days.       Impression/Recommendations     72 year old year old male with hx of Hypothyroidism, Hyperlipidemia,Bladder and Prostate cancer, Depression is being admitted s/p Left TKA on 11/02/2017    # S/P s/p Left TKA on 11/02/2017 :  Management primarily per Ortho team.  - Wound cares, Dressings, Surgical pain management, DVT Prophylaxis  per primary team.   - Monitor anemia, hemodynamics, Input/Output  - Encourage Incentive spirometry  - Laxatives for constipation prophylaxis     # Hx of Hypothyroidism: PTA on Atorvastatin   - Continue    # Hx of Anxiety, Depression: PTA on Escitalopram  - Continue   # Hx of Hyperlipidemia: PTA no Atorvastatin  - Continue  # Hx of Asthma: PTA on Beclomethasone BID. Reports good control    # Hx of bladder/Prostate cancer: PTA on Tamsulosin, and Finasteride  - Continue    # UTI prevention: Patient reports that his Urologist  wanted him to be on Cephalexin for 2 weeks from surgery as preventive measure. No indication in the pre operative chart. Will confirm tomorrow morning.   Patient intermittently falling asleep.   - Cephalexin             Robert Crane  Internal Medicine/Hospitalist  [facility]  [phone]

## 2017-11-02 NOTE — IP AVS SNAPSHOT
8A: [phone]                                              INTERAGENCY TRANSFER FORM - PHYSICIAN ORDERS   2017                    Hospital Admission Date: 2017  JANICE MCCOY   : 1945  Sex: Male        Attending Provider: Karlos Hill MD     Allergies:  Penicillins    Infection:  None   Service:  ORTHOPEDICS    Ht:  1.829 m (6')   Wt:  94.6 kg (208 lb 8.9 oz)   Admission Wt:  94.6 kg (208 lb 8.9 oz)    BMI:  28.29 kg/m 2   BSA:  2.19 m 2            Patient PCP Information     Provider PCP Type    [facility]      ED Clinical Impression     Diagnosis Description Comment Added By Time Added    Status post knee surgery [Z98.890] Status post knee surgery [Z98.890]  Rosanne Berman MD 11/3/2017 10:08 AM    Need for prophylaxis against urinary tract infection [Z29.8] Need for prophylaxis against urinary tract infection [Z29.8]  Robert Crane MD 2017 10:50 AM    Hyperlipidemia LDL goal <100 [E78.5] Hyperlipidemia LDL goal <100 [E78.5]  Robert Crane MD 2017 12:27 PM      Hospital Problems as of 2017              Priority Class Noted POA    Status post knee surgery Medium  2017 Yes      Non-Hospital Problems as of 2017              Priority Class Noted    Primary osteoarthritis of left knee Medium  2016    History of above knee amputation, right (H) Medium  2016      Code Status History     Date Active Date Inactive Code Status Order ID Comments User Context    This patient has a current code status but no historical code status.         Medication Review      START taking        Dose / Directions Comments    acetaminophen 325 MG tablet   Commonly known as:  TYLENOL        Dose:  650 mg   Take 2 tablets (650 mg) by mouth every 4 hours as needed for other (surgical pain)   Quantity:  100 tablet   Refills:  1        aspirin 325 MG EC tablet        Dose:  325 mg   Take 1 tablet (325 mg) by mouth daily for 28 days   Quantity:  28  tablet   Refills:  0        cephALEXin 500 MG capsule   Commonly known as:  KEFLEX   Indication:  Urinary Tract Infection, UTI Prevention   Used for:  Need for prophylaxis against urinary tract infection        Dose:  500 mg   Start taking on:  11/6/2017   Take 1 capsule (500 mg) by mouth daily   Quantity:  11 capsule   Refills:  0        oxyCODONE IR 5 MG tablet   Commonly known as:  ROXICODONE        Dose:  5-10 mg   Take 1-2 tablets (5-10 mg) by mouth every 3 hours as needed for moderate to severe pain   Quantity:  80 tablet   Refills:  0        senna-docusate 8.6-50 MG per tablet   Commonly known as:  SENOKOT-S;PERICOLACE        Dose:  1-2 tablet   Take 1-2 tablets by mouth 2 times daily   Quantity:  40 tablet   Refills:  1          CONTINUE these medications which may have CHANGED, or have new prescriptions. If we are uncertain of the size of tablets/capsules you have at home, strength may be listed as something that might have changed.        Dose / Directions Comments    atorvastatin 10 MG tablet   Commonly known as:  LIPITOR   This may have changed:  when to take this   Used for:  Hyperlipidemia LDL goal <100        Dose:  10 mg   Take 1 tablet (10 mg) by mouth daily   Quantity:  30 tablet   Refills:  0          CONTINUE these medications which have NOT CHANGED        Dose / Directions Comments    beclomethasone 40 MCG/ACT Inhaler   Commonly known as:  QVAR        Dose:  1 puff   Inhale 1 puff into the lungs 2 times daily   Refills:  0        FLOMAX 0.4 MG capsule   Generic drug:  tamsulosin        Dose:  0.4 mg   Take 0.4 mg by mouth daily   Refills:  0        latanoprost 0.005 % ophthalmic solution   Commonly known as:  XALATAN        Dose:  1 drop   Place 1 drop Into the left eye At Bedtime   Refills:  0        LEVOTHYROXINE SODIUM PO        Dose:  125 mcg   Take 125 mcg by mouth daily   Refills:  0        LEXAPRO PO        Dose:  20 mg   Take 20 mg by mouth daily   Refills:  0        PROSCAR PO         Dose:  5 mg   Take 5 mg by mouth daily   Refills:  0                Summary of Visit     Reason for your hospital stay       You were admitted to the hospital following your total knee arthroplasty.             After Care     Activity       Your activity will be as tolerated. You should focus on knee range of motion exercises, with an emphasis on obtaining full hyperextension to 90 degrees knee flexion       Diet       You may return to your regular, pre-surgery diet unless instructed otherwise by your provider.       Discharge Instructions       TOTAL KNEE ARTHROPLASTY POSTOPERATIVE INSTRUCTIONS    A.  COMFORT:  -Elevation: Elevate your knee and ankle above the level of your heart. The best position is lying down with two pillows lengthwise under your entire leg. Do not place pillows under your knee. This should be done for the first several days after surgery.  -Swelling: An ice pack will be provided to control swelling and discomfort. Place a thin towel between your skin and the ice pack and apply for 20 minutes.   -Pain Medication: Take medication as prescribed, but only as often as necessary.  Avoid alcohol and driving if you are taking pain medication.  Remember that Tylenol can be used for pain relief as well, as you wean off the narcotics.  Maximum Tylenol dose for a single day is 4000 mg.            B.  ACTIVITIES:  -Exercises: Point and flex your feet and wiggle your toes, move your ankle around in a [place], to help prevent complications such as blood clotting in your legs. Quad muscle isometric and short arc exercises should begin the day of surgery and should be done for 10 to 15 minutes, 3 times a day, for the first few weeks after surgery. Patient to focus on full hyperextension to 90 degrees knee flexion    -CPM: 0 degrees - flexion tolerance with goal of 90 degrees flexion  -Weight bearing status: You may put weight on your operative leg (weight bearing as tolerated) but may be limited due to pain.  Walk using assistive devices as needed.   -Physical therapy: A prescription for physical therapy will be administered. You will also be provided with a physical therapy protocol. Both the prescription and protocol should be taken with you to your first appointment  -Driving: Driving is NOT permitted until allowed by your provider.  -Athletic activities: Athletic activities, such as swimming, bicycling, jogging, running and stop-and-go-sports should be avoided until allowed by your doctor.  -Return to work: May returned to work when allowed by your provider.     C. INCISION CARE:    -Keep the initial post-op dressing on for 7 days, as discussed above. You may shower 72 hours after surgery, however the dressing on your knee should remain in place during showering. It is acceptable for water to run over the dressing, but you are not to soak or submerge your wound underwater.       Wound care and dressings       A clean dressing/bandage was placed on your surgical wound shortly before you were discharged from the hospital. This dressing is to stay in place for at least 7 days following your surgery. If the dressing becomes saturated with wound drainage, it is appropriate to replace the dressing/bandage with sterile 4x4 gauze with tape over top to secure the gauze about the wound. If drainage persists from the incision site to the extent that it is frequently saturating the dressing, please contact your clinic nurse.             Your next 10 appointments already scheduled     Nov 16, 2017 12:30 PM CST   (Arrive by 12:15 PM)   Post-Op with KINGSTON Rodas CNP   [facility]    [address] [place] [id number] [phone]            Dec 11, 2017  1:15 PM CST   (Arrive by 1:00 PM)   Return Visit with Karlos Hill MD   [facility]    [address] [place] [address]  [id number]   [phone]              Follow-Up Appointment Instructions     Future Labs/Procedures    Adult [facility] Follow-up and recommended labs and tests     Comments:    You are to return to clinic in 2 weeks for wound check with the clinic nurse. Approximately 6 weeks after your surgery, you will be scheduled for an appointment with your doctor. At this time, AP and lateral knee imaging will be obtained.    If you need to schedule your 2 and 6 week postoperative visits or haven't received confirmation regarding these visits, please call one of the following numbers within 7 days of discharge.    -[facility]: [phone]  -[facility]: [phone]      Follow-Up Appointment Instructions     Adult [facility] Follow-up and recommended labs and tests       You are to return to clinic in 2 weeks for wound check with the clinic nurse. Approximately 6 weeks after your surgery, you will be scheduled for an appointment with your doctor. At this time, AP and lateral knee imaging will be obtained.    If you need to schedule your 2 and 6 week postoperative visits or haven't received confirmation regarding these visits, please call one of the following numbers within 7 days of discharge.    -[facility]: [phone]  -[facility]: [phone]             Statement of Approval     Ordered          11/05/17 1013  I have reviewed and agree with all the recommendations and orders detailed in this document.  EFFECTIVE NOW     Approved and electronically signed by:  Karlos Hill MD

## 2017-11-02 NOTE — ANESTHESIA POSTPROCEDURE EVALUATION
Patient: Palomo Zapata    Procedure(s):  Left Total Knee Arthroplasty   - Wound Class: I-Clean    Diagnosis:Osteoarthritis Left Knee   Diagnosis Additional Information: No value filed.    Anesthesia Type:  Spinal, Periph. Nerve Block for postop pain    Note:  Anesthesia Post Evaluation    Patient location during evaluation: PACU  Patient participation: Able to participate in evaluation but full recovery from regional anesthesia has not yet ocurrred but is anticipated to occur within 48 hours  Level of consciousness: awake and alert  Pain management: adequate  Airway patency: patent  Cardiovascular status: hemodynamically stable  Respiratory status: nonlabored ventilation, spontaneous ventilation and nasal cannula  Hydration status: euvolemic  PONV: none     Anesthetic complications: None          Last vitals:  Vitals:    11/02/17 1345 11/02/17 1400 11/02/17 1415   BP: 111/59 110/62    Resp: 10 12 11   Temp: 36.6  C (97.9  F)  36.7  C (98.1  F)   SpO2: 98% 97%          Electronically Signed By: Spike Watkins MD  November 2, 2017  2:20 PM

## 2017-11-02 NOTE — IP AVS SNAPSHOT
8A: [phone]                                              INTERAGENCY TRANSFER FORM - LAB / IMAGING / EKG / EMG RESULTS   2017                    Hospital Admission Date: 2017  JANICE MCCOY   : 1945  Sex: Male        Attending Provider: Karlos Hill MD     Allergies:  Penicillins    Infection:  None   Service:  ORTHOPEDICS    Ht:  1.829 m (6')   Wt:  94.6 kg (208 lb 8.9 oz)   Admission Wt:  94.6 kg (208 lb 8.9 oz)    BMI:  28.29 kg/m 2   BSA:  2.19 m 2            Patient PCP Information     Provider PCP Type    [facility]         Lab Results - 3 Days      Hemoglobin [[id number]] (Abnormal)  Resulted: 17 0633, Result status: Final result    Ordering provider: Robert Crane MD  17 0000 Resulting lab: [facility]    Specimen Information    Type Source Collected On   Blood  17 0618          Components       Value Reference Range Flag Lab   Hemoglobin 10.2 13.3 - 17.7 g/dL L 13            Urine Culture Aerobic Bacterial [[id number]]  Resulted: 17 0755, Result status: Final result    Ordering provider: Karlos Hill MD  17 0905 Resulting lab: [facility]    Specimen Information    Type Source Collected On   Unspecified Urine  17 0905          Components       Value Reference Range Flag Lab   Specimen Description Unspecified Urine      Special Requests Specimen received in preservative   75   Culture Micro No growth   225            Basic metabolic panel [[id number]] (Abnormal)  Resulted: 17 0657, Result status: Final result    Ordering provider: Karlos Hill MD  17 0001 Resulting lab: [facility]    Specimen Information    Type Source Collected On   Blood  17 0624          Components       Value Reference Range Flag Lab   Sodium 135 133 - 144 mmol/L  13   Potassium 4.0 3.4 - 5.3 mmol/L  13   Chloride 105 94 -  109 mmol/L  13   Carbon Dioxide 25 20 - 32 mmol/L  13   Anion Gap 5 3 - 14 mmol/L  13   Glucose 100 70 - 99 mg/dL H 13   Urea Nitrogen 23 7 - 30 mg/dL  13   Creatinine 1.16 0.66 - 1.25 mg/dL  13   GFR Estimate 61 >60 mL/min/1.7m2  13   Comment:  Non  GFR Calc   GFR Estimate If Black 74 >60 mL/min/1.7m2  13   Comment:  African American GFR Calc   Calcium 7.2 8.5 - 10.1 mg/dL L 13            CBC with platelets [[id number]] (Abnormal)  Resulted: 11/03/17 0651, Result status: Final result    Ordering provider: Karlos Hill MD  11/03/17 0001 Resulting lab: [facility]    Specimen Information    Type Source Collected On   Blood  11/03/17 0624          Components       Value Reference Range Flag Lab   WBC 10.1 4.0 - 11.0 10e9/L  13   RBC Count 3.50 4.4 - 5.9 10e12/L L 13   Hemoglobin 9.9 13.3 - 17.7 g/dL L 13   Hematocrit 30.4 40.0 - 53.0 % L 13   MCV 87 78 - 100 fl  13   MCH 28.3 26.5 - 33.0 pg  13   MCHC 32.6 31.5 - 36.5 g/dL  13   RDW 14.4 10.0 - 15.0 %  13   Platelet Count 189 150 - 450 10e9/L  13            UA with Microscopic reflex to Culture [[id number]] (Abnormal)  Resulted: 11/02/17 0935, Result status: Final result    Ordering provider: Karlos Hill MD  11/02/17 0905 Resulting lab: [facility]    Specimen Information    Type Source Collected On   Urine Urine clean catch 11/02/17 0905          Components       Value Reference Range Flag Lab   Color Urine Light Yellow   13   Appearance Urine Slightly Cloudy   13   Glucose Urine Negative NEG^Negative mg/dL  13   Bilirubin Urine Negative NEG^Negative  13   Ketones Urine Negative NEG^Negative mg/dL  13   Specific Gravity Urine 1.005 1.003 - 1.035  13   Blood Urine Moderate NEG^Negative A 13   pH Urine 7.0 5.0 - 7.0 pH  13   Protein Albumin Urine Negative NEG^Negative mg/dL  13   Urobilinogen mg/dL Normal 0.0 - 2.0 mg/dL  13   Nitrite Urine Negative NEG^Negative  13   Leukocyte  Esterase Urine Large NEG^Negative A 13   Source Clean catch urine   13   WBC Urine 165 0 - 2 /HPF H 13   RBC Urine 1 0 - 2 /HPF  13   Hyaline Casts 1 0 - 2 /LPF  13            Potassium [[id number]]  Resulted: 11/02/17 0931, Result status: Final result    Ordering provider: Spike Watkins MD  11/02/17 0851 Resulting lab: [facility]    Specimen Information    Type Source Collected On   Blood  11/02/17 0909          Components       Value Reference Range Flag Lab   Potassium 4.0 3.4 - 5.3 mmol/L  13            Creatinine [[id number]]  Resulted: 11/02/17 0931, Result status: Final result    Ordering provider: Spike Watkins MD  11/02/17 0851 Resulting lab: [facility]    Specimen Information    Type Source Collected On   Blood  11/02/17 0909          Components       Value Reference Range Flag Lab   Creatinine 1.06 0.66 - 1.25 mg/dL  13   GFR Estimate 68 >60 mL/min/1.7m2  13   Comment:  Non  GFR Calc   GFR Estimate If Black 82 >60 mL/min/1.7m2  13   Comment:   GFR Calc            Glucose [[id number]]  Resulted: 11/02/17 0931, Result status: Final result    Ordering provider: Spike Watkins MD  11/02/17 0851 Resulting lab: [facility]    Specimen Information    Type Source Collected On   Blood  11/02/17 0909          Components       Value Reference Range Flag Lab   Glucose 98 70 - 99 mg/dL  13            Testing Performed By     Lab - Abbreviation Name Director Address Valid Date Range    13 - Unknown [facility] Unknown [address] [address] 01/15/15 0916 - Present    75 - Unknown [facility] Unknown [address] [address] 01/15/15 1019 - Present    225 - Unknown [facility] Unknown [address] [facility] [address] 12/19/14 0954 - Present             Unresulted Labs     None         Imaging Results - 3 Days      XR Knee Port Left 1/2 Views [[id number]]  Resulted: 11/02/17 1434, Result status: Final result    Ordering provider: Karlos Hill MD  11/02/17 1403 Resulted by: Chris Canas MD    Performed: 11/02/17 1407 - 11/02/17 1424 Resulting lab: RADIOLOGY RESULTS    Narrative:       XR KNEE PORT LT 1/2 VW 11/2/2017 2:24 PM    HISTORY: Postop.    COMPARISON: None.      Impression:       IMPRESSION: Status post knee arthroplasty.  Hardware is intact.  Alignment is anatomic. There is a surgical drain within the knee  joint.    CHRIS CANAS MD      Testing Performed By     Lab - Abbreviation Name Director Address Valid Date Range    104 - Rad Rslts RADIOLOGY RESULTS Unknown Unknown 02/16/05 1553 - Present                Encounter-Level Documents:     There are no encounter-level documents.      Order-Level Documents - 11/02/2017:     Scan on 10/19/2017  7:39 AM by Outside, Provider : [facility] (below)

## 2017-11-02 NOTE — IP AVS SNAPSHOT
[facility]    [address]    [place] [address]    Phone:  [phone]                                       After Visit Summary   11/2/2017    Palomo Zapata    MRN: [id number]           After Visit Summary Signature Page     I have received my discharge instructions, and my questions have been answered. I have discussed any challenges I see with this plan with the nurse or doctor.    ..........................................................................................................................................  Patient/Patient Representative Signature      ..........................................................................................................................................  Patient Representative Print Name and Relationship to Patient    ..................................................               ................................................  Date                                            Time    ..........................................................................................................................................  Reviewed by Signature/Title    ...................................................              ..............................................  Date                                                            Time

## 2017-11-02 NOTE — IP AVS SNAPSHOT
` `     UR 8A: [phone]                 INTERAGENCY TRANSFER FORM - NOTES (H&P, Discharge Summary, Consults, Procedures, Therapies)   2017                    Hospital Admission Date: 2017  PALOMO ZAPATA   : 1945  Sex: Male        Patient PCP Information     Provider PCP Type    [place] [facility]         History & Physicals      H&P signed by Hola Mckeon at 10/19/2017  7:39 AM      Author:  Hola Mckeon Service:  (none) Author Type:  Physician    Filed:  10/19/2017  7:39 AM Date of Service:  10/19/2017  7:38 AM Creation Time:  10/19/2017  7:39 AM    Status:  Signed :  Hola Mckeon (Physician)     Scan on 10/19/2017  7:39 AM by Mike Provider : [facility] 10/18/17 1          Revision History        User Key Date/Time User Provider Type Action    > [N/A] 10/19/2017  7:39 AM Mike, Provider Physician Sign                     Discharge Summaries      Discharge Summaries by Nikko Germain MD at 11/3/2017 10:10 AM     Author:  Nikko Germain MD Service:  Orthopedics Author Type:  Fellow    Filed:  2017 10:01 AM Date of Service:  11/3/2017 10:10 AM Creation Time:  11/3/2017 10:08 AM    Status:  Cosign Needed :  Nikko Germain MD (Fellow)    Cosign Required:  Yes             Orthopaedic Surgery  Discharge Summary    Palomo Zapata MRN# [id number]   YOB: 1945 Age: 72 year old     Date of Admission:  2017  Date of Discharge::[LM1.1]  2017[JC1.1]  Admitting Physician:  Karlos Hill MD  Discharge Physician:  Rosanne Berman MD  Primary Care Physician:        [facility], [place]          Admission Diagnoses:   S/P total knee arthroplasty, left [Z96.652]          Discharge Diagnosis:   Left knee osteoarthritis         Procedures:   17 Left total knee arthroplasty, Dr. Hill         Consultations:   OCCUPATIONAL THERAPY ADULT IP CONSULT  PHYSICAL THERAPY ADULT IP CONSULT  INTERNAL MEDICINE  ADULT IP CONSULT FOR [facility]         Medications Prior to Admission:     Prescriptions Prior to Admission   Medication Sig Dispense Refill Last Dose     latanoprost (XALATAN) 0.005 % ophthalmic solution Place 1 drop Into the left eye At Bedtime        Finasteride (PROSCAR PO) Take 5 mg by mouth daily   11/1/2017 at 1200     tamsulosin (FLOMAX) 0.4 MG capsule Take 0.4 mg by mouth daily    11/1/2017 at 0800     Escitalopram Oxalate (LEXAPRO PO) Take 20 mg by mouth daily    11/2/2017 at 0630     beclomethasone (QVAR) 40 MCG/ACT Inhaler Inhale 1 puff into the lungs 2 times daily   11/2/2017 at 0630     ATORVASTATIN CALCIUM PO Take 10 mg by mouth   11/1/2017 at 0800     LEVOTHYROXINE SODIUM PO Take 125 mcg by mouth daily    11/1/2017 at 1200            Discharge Medications:      Palomo Zapata   Home Medication Instructions BEN:[id number]    Printed on:11/03/17 1005   Medication Information                      acetaminophen (TYLENOL) 325 MG tablet  Take 2 tablets (650 mg) by mouth every 4 hours as needed for other (surgical pain)             aspirin  MG EC tablet  Take 1 tablet (325 mg) by mouth daily for 28 days             ATORVASTATIN CALCIUM PO  Take 10 mg by mouth             beclomethasone (QVAR) 40 MCG/ACT Inhaler  Inhale 1 puff into the lungs 2 times daily             Escitalopram Oxalate (LEXAPRO PO)  Take 20 mg by mouth daily              Finasteride (PROSCAR PO)  Take 5 mg by mouth daily             latanoprost (XALATAN) 0.005 % ophthalmic solution  Place 1 drop Into the left eye At Bedtime             LEVOTHYROXINE SODIUM PO  Take 125 mcg by mouth daily              oxyCODONE IR (ROXICODONE) 5 MG tablet  Take 1-2 tablets (5-10 mg) by mouth every 4 hours as needed for moderate to severe pain             senna-docusate (SENOKOT-S;PERICOLACE) 8.6-50 MG per tablet  Take 1-2 tablets by mouth 2 times daily             tamsulosin (FLOMAX) 0.4 MG capsule  Take 0.4 mg by mouth daily                            Brief History of Illness:   71 yo male with severe pain associated with end stage left knee osteoarthritis no longer improved with non operative management.           Hospital Course:   Pt was admitted following the above procedure. Pt completed 24 hrs of perioperative antibiotics, worked well with physical therapy, and had their pain well controlled on oral medications. On the day of discharge, the incision was c/d/i and distal neurovascular exam was intact.     Discharge Procedure Orders  Reason for your hospital stay   Order Comments: You were admitted to the hospital following your total knee arthroplasty.     Adult [facility] Follow-up and recommended labs and tests   Order Comments: You are to return to clinic in 2 weeks for wound check with the clinic nurse. Approximately 6 weeks after your surgery, you will be scheduled for an appointment with your doctor. At this time, AP and lateral knee imaging will be obtained.    If you need to schedule your 2 and 6 week postoperative visits or haven't received confirmation regarding these visits, please call one of the following numbers within 7 days of discharge.    -[facility]: [phone]  -[facility] [place]: [phone]     Activity   Order Comments: Your activity will be as tolerated. You should focus on knee range of motion exercises, with an emphasis on obtaining full hyperextension to 90 degrees knee flexion   Order Specific Question Answer Comments   Is discharge order? Yes      When to contact your care team   Order Comments: CALL YOUR PHYSICIAN IF:  -Pain in your hip persists or worsens in the first few days after surgery.  -Excessive redness or drainage of cloudy or bloody material from the wounds (Clear red tinted fluid and some mild drainage should be expected). Drainage of any kind 5 days after surgery should be reported to the doctor.  -You have a temperature elevation greater than 101.5    -You  have pain, swelling or redness in your calf.  -You have numbness or weakness in your leg or foot.      You may contact your physician at [phone] during business hours.    For after hours or weekend calls, you may contact the hospital at [phone] and ask for the on-call orthopedic resident     Wound care and dressings   Order Comments: A clean dressing/bandage was placed on your surgical wound shortly before you were discharged from the hospital. This dressing is to stay in place for at least 7 days following your surgery. If the dressing becomes saturated with wound drainage, it is appropriate to replace the dressing/bandage with sterile 4x4 gauze with tape over top to secure the gauze about the wound. If drainage persists from the incision site to the extent that it is frequently saturating the dressing, please contact your clinic nurse.     Discharge Instructions   Order Comments: TOTAL KNEE ARTHROPLASTY POSTOPERATIVE INSTRUCTIONS    A.  COMFORT:  -Elevation: Elevate your knee and ankle above the level of your heart. The best position is lying down with two pillows lengthwise under your entire leg. Do not place pillows under your knee. This should be done for the first several days after surgery.  -Swelling: An ice pack will be provided to control swelling and discomfort. Place a thin towel between your skin and the ice pack and apply for 20 minutes.   -Pain Medication: Take medication as prescribed, but only as often as necessary.  Avoid alcohol and driving if you are taking pain medication.  Remember that Tylenol can be used for pain relief as well, as you wean off the narcotics.  Maximum Tylenol dose for a single day is 4000 mg.            B.  ACTIVITIES:  -Exercises: Point and flex your feet and wiggle your toes, move your ankle around in a [place], to help prevent complications such as blood clotting in your legs. Quad muscle isometric and short arc exercises should begin the day of surgery and  should be done for 10 to 15 minutes, 3 times a day, for the first few weeks after surgery. Patient to focus on full hyperextension to 90 degrees knee flexion    -CPM: 0 degrees - flexion tolerance with goal of 90 degrees flexion  -Weight bearing status: You may put weight on your operative leg (weight bearing as tolerated) but may be limited due to pain. Walk using assistive devices as needed.   -Physical therapy: A prescription for physical therapy will be administered. You will also be provided with a physical therapy protocol. Both the prescription and protocol should be taken with you to your first appointment  -Driving: Driving is NOT permitted until allowed by your provider.  -Athletic activities: Athletic activities, such as swimming, bicycling, jogging, running and stop-and-go-sports should be avoided until allowed by your doctor.  -Return to work: May returned to work when allowed by your provider.     C. INCISION CARE:    -Keep the initial post-op dressing on for 7 days, as discussed above. You may shower 72 hours after surgery, however the dressing on your knee should remain in place during showering. It is acceptable for water to run over the dressing, but you are not to soak or submerge your wound underwater.     Diet   Order Comments: You may return to your regular, pre-surgery diet unless instructed otherwise by your provider.   Order Specific Question Answer Comments   Is discharge order? Yes      Assign Questionnaire Series to Patient              Discharge Disposition:[LM1.1]   Discharged to nursing home[JC1.1]      Condition at discharge:[LM1.1] Stable[JC1.1]    Rosanne Berman MD                   :[LM1.1]         Revision History        User Key Date/Time User Provider Type Action    > JC1.1 11/5/2017 10:01 AM Nikko Germain MD Fellow Sign     LM1.1 11/3/2017 10:10 AM Rosanne Berman MD Resident Share                     Consult Notes      Consults by Robert Crane MD at 11/2/2017   4:38 PM     Author:  Robert Crane MD Service:  Hospitalist Author Type:  Physician    Filed:  11/2/2017  4:54 PM Date of Service:  11/2/2017  4:38 PM Creation Time:  11/2/2017  4:31 PM    Status:  Signed :  Robert Crane MD (Physician)     Consult Orders:    1. Internal Medicine Adult IP Consult for [facility]: Patient to be seen: Routine within 24 hrs; Call back #: [phone]; medical mgmt s/p tka, please review home meds dosages missing; Consultant may enter orders: Yes [[id number]] ordered by Karlos Hill MD at 11/02/17 1014                HOSPITALIST INITIAL CONSULT NOTE    Referring Provider:  Karlos Hill MD      Reason for Consult         History of Present Illness     Palomo Zapata is 72 year old year old male with hx of Hypothyroidism, Hyperlipidemia,Bladder and Prostate cancer, Depression is being admitted s/p Left TKA on 11/02/2017  EBL    ml.   Currently, reports pain is controlled. Denies any nausea, vomiting, chest pain, shortness of breath, lightheadedness or dizziness.             Past Medical History     Past Medical History:   Diagnosis Date     Above knee amputation of right lower extremity (H)      Anxiety      Cancer (H)      Complication of anesthesia     BP low after spinal      Depressive disorder      Thyroid disease      Uncomplicated asthma           Past Surgical History     Past Surgical History:   Procedure Laterality Date     GENITOURINARY SURGERY       ORTHOPEDIC SURGERY            Medications     All his current medications are reviewed in current medication section of [facility].  Home medications are reviewed.  Current Facility-Administered Medications   Medication     NaCl 0.9 % 8.95 mL with ropivacaine (NAROPIN) 200 mg, EPINEPHrine (ADRENALIN) 300 mcg, ketorolac (TORADOL) 15 mg, morphine 2.5 mg     lidocaine (LMX4) kit     sodium chloride (PF) 0.9% PF flush 3 mL     sodium chloride (PF) 0.9% PF flush 3 mL     lactated ringers infusion     bupivacaine  "liposome (EXPAREL) LONG ACTING injection was administered into the infiltration site to produce postsurgical analgesia. Duration of action is up to 72 hours, and other \"sabra\" medications should not be given for 96 hours with the exception of the lidocaine 5% patch (LIDODERM) and the lidocaine 10mg in potassium infusions. This entry is for INFORMATION ONLY.     atorvastatin (LIPITOR) tablet 10 mg     fluticasone furoate (ARNUITY ELLIPTA) 100 MCG/ACT inhalation powder 1 puff     [START ON 11/3/2017] finasteride (PROSCAR) tablet 5 mg     [START ON 11/3/2017] tamsulosin (FLOMAX) capsule 0.4 mg     lidocaine 1 % 1 mL     lidocaine (LMX4) kit     sodium chloride (PF) 0.9% PF flush 3 mL     sodium chloride (PF) 0.9% PF flush 3 mL     0.9% sodium chloride infusion     benzocaine-menthol (CEPACOL) 15-3.6 MG lozenge 1-2 lozenge     naloxone (NARCAN) injection 0.1-0.4 mg     clindamycin (CLEOCIN) infusion 900 mg     acetaminophen (TYLENOL) tablet 975 mg     [START ON 11/5/2017] acetaminophen (TYLENOL) tablet 650 mg     oxyCODONE IR (ROXICODONE) tablet 5-10 mg     diphenhydrAMINE (BENADRYL) solution 12.5 mg    Or     diphenhydrAMINE (BENADRYL) injection 12.5 mg     ondansetron (ZOFRAN-ODT) ODT tab 4 mg    Or     ondansetron (ZOFRAN) injection 4 mg     prochlorperazine (COMPAZINE) injection 5 mg    Or     prochlorperazine (COMPAZINE) tablet 5 mg     metoclopramide (REGLAN) tablet 5 mg    Or     metoclopramide (REGLAN) injection 5 mg     senna-docusate (SENOKOT-S;PERICOLACE) 8.6-50 MG per tablet 1-2 tablet     [START ON 11/3/2017] melatonin tablet 1 mg        Allergies        Allergies   Allergen Reactions     Penicillins      Palomo does not remember what the reaction was but it was said he is allergic        Family History     History reviewed. No pertinent family history.       Social History     Social History     Social History     Marital status:      Spouse name: N/A     Number of children: N/A     Years of " education: N/A     Occupational History     Not on file.     Social History Main Topics     Smoking status: Former Smoker     Quit date: 10/27/1988     Smokeless tobacco: Never Used     Alcohol use No     Drug use: No     Sexual activity: Not on file     Other Topics Concern     Not on file     Social History Narrative        Review of Systems   A 10 point review of systems was taken and largely negative except for that which was stated above.      Physical Exam       Vital signs:    Blood pressure 101/53, temperature 97.6  F (36.4  C), temperature source Oral, resp. rate 16, height 1.829 m (6'), weight 94.6 kg (208 lb 8.9 oz), SpO2 94 %.  Estimated body mass index is 28.29 kg/(m^2) as calculated from the following:    Height as of this encounter: 1.829 m (6').    Weight as of this encounter: 94.6 kg (208 lb 8.9 oz).      Intake/Output Summary (Last 24 hours) at 11/02/17 1631  Last data filed at 11/02/17 1513   Gross per 24 hour   Intake          1620.03 ml   Output              550 ml   Net          1070.03 ml      HEENT: No icterus, no pallor  Cardiovascular: S1, S2 normal.   Respiratory: B/L CTA  Abdomen: Soft, NT, BS+  Neurology:[PD1.1] Intermittently falls asleep. Conversant. No tremors.[PD1.2]  Extremities: Left knee dressing in place.        Laboratory and Imaging Studies     Laboratory and Imaging studies reviewed in the results review section of Epic. Pertinent studies are as below:    CMP  Recent Labs  Lab 11/02/17  0909   POTASSIUM 4.0   GLC 98   CR 1.06   GFRESTIMATED 68   GFRESTBLACK 82     CBCNo lab results found in last 7 days.  INRNo lab results found in last 7 days.  Arterial Blood GasNo lab results found in last 7 days.       Impression/Recommendations     72 year old year old male with hx of Hypothyroidism, Hyperlipidemia,Bladder and Prostate cancer, Depression is being admitted s/p Left TKA on 11/02/2017    # S/P s/p Left TKA on 11/02/2017 :  Management primarily per Ortho team.  - Wound cares,  Dressings, Surgical pain management, DVT Prophylaxis  per primary team.   - Monitor anemia, hemodynamics, Input/Output  - Encourage Incentive spirometry  - Laxatives for constipation prophylaxis     # Hx of Hypothyroidism: PTA on Atorvastatin   - Continue    # Hx of Anxiety, Depression: PTA on Escitalopram  - Continue   # Hx of Hyperlipidemia: PTA no Atorvastatin  - Continue[PD1.1]  # Hx of Asthma: PTA on Beclomethasone BID. Reports good control    # Hx of bladder/Prostate cancer: PTA on Tamsulosin, and Finasteride  - Continue    # UTI prevention: Patient reports that his Urologist wanted him to be on Cephalexin for 2 weeks from surgery as preventive measure. No indication in the pre operative chart. Will confirm tomorrow morning.   Patient intermittently falling asleep.   - Cephalexin[PD1.2]             Robert Crane  Internal Medicine/Hospitalist  [facility]  [phone][PD1.1]         Revision History        User Key Date/Time User Provider Type Action    > PD1.2 11/2/2017  4:54 PM Robert Crane MD Physician Sign     PD1.1 11/2/2017  4:31 PM Robert Crane MD Physician                      Progress Notes - Physician (Notes from 11/02/17 through 11/05/17)      Progress Notes by Robert Crane MD at 11/5/2017 10:02 AM     Author:  Robert Crane MD Service:  Hospitalist Author Type:  Physician    Filed:  11/5/2017 10:15 AM Date of Service:  11/5/2017 10:02 AM Creation Time:  11/5/2017 10:02 AM    Status:  Addendum :  Robert Crane MD (Physician)         [facility]   Internal Medicine Daily Note           Interval History/Events     Overnight events reviewed  Reports doing well.   No nausea, vomiting, chest pain, shortness of breath  No lightheadedness or dizziness.[PD1.1]   Had BM yesterday  Tolerating diet well.[PD1.2]        Review of Systems        4 point ROS including Respiratory, CV, GI and , other than that noted above is negative   "    Medications   I have reviewed current medications  in the \"current medication\" section of Epic.  Relevant changes include:     Physical Exam   General:       Vital signs:[PD1.1]    Blood pressure 134/68, pulse 97, temperature 96.5  F (35.8  C), temperature source Axillary, resp. rate 16, height 1.829 m (6'), weight 94.6 kg (208 lb 8.9 oz), SpO2 95 %.  Estimated body mass index is 28.29 kg/(m^2) as calculated from the following:    Height as of this encounter: 1.829 m (6').    Weight as of this encounter: 94.6 kg (208 lb 8.9 oz).[PD1.3]      Intake/Output Summary (Last 24 hours) at 11/03/17 1223  Last data filed at 11/03/17 0508   Gross per 24 hour   Intake           1426.7 ml   Output             1230 ml   Net            196.7 ml      HEENT: No icterus, no pallor  Cardiovascular: S1, S2 normal.   Respiratory: B/L CTA  Abdomen: Soft, NT, BS+  Neurology: Alert awake, and oriented. No tremors.  Extremities: Right LE amputation.  Left knee dressing in place.      Laboratory and Imaging Studies     I have reviewed  laboratory and imaging studies in the Epic. Pertinent findings are as below:    BMP[PD1.1]    Recent Labs  Lab 11/03/17  0624 11/02/17  0909     --    POTASSIUM 4.0 4.0   CHLORIDE 105  --    MÓNICA 7.2*  --    CO2 25  --    BUN 23  --    CR 1.16 1.06   * 98[PD1.3]     CBC[PD1.1]    Recent Labs  Lab 11/04/17  0618 11/03/17  0624   WBC  --  10.1   RBC  --  3.50*   HGB 10.2* 9.9*   HCT  --  30.4*   MCV  --  87   MCH  --  28.3   MCHC  --  32.6   RDW  --  14.4   PLT  --  189[PD1.3]     INR[PD1.1]No lab results found in last 7 days.[PD1.3]  LFTs[PD1.1]No lab results found in last 7 days.[PD1.3]   PANC[PD1.1]No lab results found in last 7 days.[PD1.3]        Impression/Plan        72 year old year old male with hx of Hypothyroidism, Hyperlipidemia,Bladder and Prostate cancer, Depression is being admitted s/p Left TKA on 11/02/2017     # S/P s/p Left TKA on 11/02/2017 :  Management primarily per Ortho " team.  - Wound cares, Dressings, Surgical pain management, DVT Prophylaxis  per primary team.   - Monitor anemia, hemodynamics, Input/Output  - Encourage Incentive spirometry  - Laxatives for constipation prophylaxis    # Anemia: Most likely due to acute blood loss, hemodilution  Hg 10.2 on 11/04  - Transfuse if Hg < 7 gm/dl    # Hypotension on 11/03: Asymptomatic. Resolved with IV fluid bolus.   # Hx of Hypothyroidism: PTA on Atorvastatin   - Continue     # Hx of Anxiety, Depression: PTA on Escitalopram  - Continue   # Hx of Hyperlipidemia: PTA no Atorvastatin  - Continue  # Hx of Asthma: PTA on Beclomethasone BID. Reports good control     # Hx of bladder/Prostate cancer: PTA on Tamsulosin, and Finasteride  - Continue     # UTI prevention: Patient reports that his Urologist wanted him to be on Cephalexin for 2 weeks from surgery as preventive measure..   - Cephalexin 500 mg daily            Robert Crane  Internal Medicine/Hospitalist  [facility]  [phone]        Pt's care was discussed with bedside RN, patient and  during Care Team Rounds.               Robert Crane MD  Hospitalist ( Internal medicine)  Pager: [phone][PD1.1]        Revision History        User Key Date/Time User Provider Type Action    > [N/A] 11/5/2017 10:15 AM Robert Crane MD Physician Addend     PD1.2 11/5/2017 10:14 AM Robert Crane MD Physician Sign     PD1.3 11/5/2017 10:03 AM Robert Crane MD Physician      PD1.1 11/5/2017 10:02 AM Robert Crane MD Physician             Progress Notes by Ana Cristina Klein MSW at 11/4/2017  1:31 PM     Author:  Ana Cristina Klein MSW Service:  (none) Author Type:      Filed:  11/4/2017  2:04 PM Date of Service:  11/4/2017  1:31 PM Creation Time:  11/4/2017  1:31 PM    Status:  Addendum :  Ana Cristina Klein MSW ()         If pt. Ready for d/c Sunday 11/5/17, please call [facility]'s charge nurse (Judith) [phone]  Verify fax #  with her [phone][JH1.1]  Cannot arrive before noon. Wife to transport.[JH1.2]    On-call Social Work pager # [phone][JH1.1]     Revision History        User Key Date/Time User Provider Type Action    > JH1.2 2017  2:04 PM Ana Cristina Klein MSW  Addend     JH1.1 2017  1:32 PM Ana Cristina Klein MSW  Sign            Progress Notes by Nikko Germain MD at 2017 12:13 PM     Author:  Nikko Germain MD Service:  Orthopedics Author Type:  Fellow    Filed:  2017 12:15 PM Date of Service:  2017 12:13 PM Creation Time:  2017 12:13 PM    Status:  Addendum :  Nikko Germain MD (Fellow)         Orthopaedic Surgery Progress Note     Subjective/Events: No acute overnight events. Pain controlled. Tolerating PO, le in place[JC1.1] to be removed today.  Drain removed this morning.[JC1.2]      Objective:  /59 (BP Location: Left arm)  Pulse 87  Temp 97.5  F (36.4  C) (Oral)  Resp 16  Ht 1.829 m (6')  Wt 94.6 kg (208 lb 8.9 oz)  SpO2 94%  BMI 28.29 kg/m2  Temp (24hrs), Av  F (36.7  C), Min:97.6  F (36.4  C), Max:98.6  F (37  C)      Gen: A&Ox3, no acute distress  CV: 2+ dp/pt pulses, capillary refill < 2sec  Resp: breathing equal and non-labored, no wheezing  RLE AKA  LLE: dressing c/d/i, 5/5 TA GSC EHL FHL, SILT sp, dp, tib, s, s nn; 2+ dp pulse    Assessment: 72M w R AKA and hx of bladder cancer (condom caths at baseline), s/p L TKA     Activity: Up with assist.  Weight bearing status: AT   Le: remove POD2 and transition back to condom caths  Antibiotics: Clinda x 24 hours.  Diet: Begin with clear fluids and progress diet as tolerated.  Bowel regimen. Anti-emetics PRN.    DVT prophylaxis:  mg  Dressing: changed today  Drains: removed today  Pain management: transition from IV to orals as tolerated.    Physical Therapy/Occupational Therapy  Follow-up: Clinic with Dr. Hill in 2 weeks, x-rays needed.    Dispo: DC to  "tcu POD3 pending PT    Signed:   Nikko Germain MD  Adult Reconstructive Surgery Fellow  Dept Orthopaedic Surgery, [facility][JC1.1]           Revision History        User Key Date/Time User Provider Type Action    > [N/A] 11/4/2017 12:15 PM Nikko Germain MD Fellow Addend     JC1.2 11/4/2017 12:14 PM Nkiko Germain MD Fellow Sign     JC1.1 11/4/2017 12:13 PM Nikko Germain MD Fellow             Progress Notes by Robert Crane MD at 11/4/2017 11:24 AM     Author:  Robert Crane MD Service:  Hospitalist Author Type:  Physician    Filed:  11/4/2017 11:26 AM Date of Service:  11/4/2017 11:24 AM Creation Time:  11/4/2017 11:24 AM    Status:  Signed :  Robert Crane MD (Physician)         [facility], [place]   Internal Medicine Daily Note           Interval History/Events     Overnight events reviewed  Reports doing well  No ligtheadedess or dizziness  Pain controlled.   No fever, chills nausea, vomiting  Passing gas and no BM yet       Review of Systems        4 point ROS including Respiratory, CV, GI and , other than that noted above is negative      Medications   I have reviewed current medications  in the \"current medication\" section of Epic.  Relevant changes include:     Physical Exam   General:       Vital signs:    Blood pressure 112/59, pulse 87, temperature 97.5  F (36.4  C), temperature source Oral, resp. rate 16, height 1.829 m (6'), weight 94.6 kg (208 lb 8.9 oz), SpO2 94 %.  Estimated body mass index is 28.29 kg/(m^2) as calculated from the following:    Height as of this encounter: 1.829 m (6').    Weight as of this encounter: 94.6 kg (208 lb 8.9 oz).      Intake/Output Summary (Last 24 hours) at 11/03/17 1223  Last data filed at 11/03/17 0508   Gross per 24 hour   Intake           1426.7 ml   Output             1230 ml   Net            196.7 ml      HEENT: No icterus, no pallor  Cardiovascular: S1, S2 normal.   Respiratory: B/L " CTA  Abdomen: Soft, NT, BS+  Neurology: Alert awake, and oriented. No tremors.  Extremities: Right LE amputation.  Left knee dressing in place.      Laboratory and Imaging Studies     I have reviewed  laboratory and imaging studies in the Epic. Pertinent findings are as below:    BMP    Recent Labs  Lab 11/03/17  0624 11/02/17  0909     --    POTASSIUM 4.0 4.0   CHLORIDE 105  --    MÓNICA 7.2*  --    CO2 25  --    BUN 23  --    CR 1.16 1.06   * 98     CBC    Recent Labs  Lab 11/04/17  0618 11/03/17  0624   WBC  --  10.1   RBC  --  3.50*   HGB 10.2* 9.9*   HCT  --  30.4*   MCV  --  87   MCH  --  28.3   MCHC  --  32.6   RDW  --  14.4   PLT  --  189     INRNo lab results found in last 7 days.  LFTsNo lab results found in last 7 days.   PANCNo lab results found in last 7 days.        Impression/Plan        72 year old year old male with hx of Hypothyroidism, Hyperlipidemia,Bladder and Prostate cancer, Depression is being admitted s/p Left TKA on 11/02/2017     # S/P s/p Left TKA on 11/02/2017 :  Management primarily per Ortho team.  - Wound cares, Dressings, Surgical pain management, DVT Prophylaxis  per primary team.   - Monitor anemia, hemodynamics, Input/Output  - Encourage Incentive spirometry  - Laxatives for constipation prophylaxis    # Anemia: Most likely due to acute blood loss, hemodilution  Hg 10.2 on 11/04  - Transfuse if Hg < 7 gm/dl    # Hypotension on 11/03: Asymptomatic. Resolved with IV fluid bolus.   # Hx of Hypothyroidism: PTA on Atorvastatin   - Continue     # Hx of Anxiety, Depression: PTA on Escitalopram  - Continue   # Hx of Hyperlipidemia: PTA no Atorvastatin  - Continue  # Hx of Asthma: PTA on Beclomethasone BID. Reports good control     # Hx of bladder/Prostate cancer: PTA on Tamsulosin, and Finasteride  - Continue     # UTI prevention: Patient reports that his Urologist wanted him to be on Cephalexin for 2 weeks from surgery as preventive measure..   - Cephalexin 500 mg daily             Robert Crane  Internal Medicine/Hospitalist  [facility]  [phone]                          Pt's care was discussed with bedside RN, patient and  during Care Team Rounds.               Robert Crane MD  Hospitalist ( Internal medicine)  Pager: [phone][PD1.1]        Revision History        User Key Date/Time User Provider Type Action    > PD1.1 11/4/2017 11:26 AM Robert Crane MD Physician Sign            Progress Notes by Benoit Rai at 11/3/2017  1:51 PM     Author:  Benoit Rai Service:  (none) Author Type:  Licensed Acupuncturist    Filed:  11/3/2017  3:42 PM Date of Service:  11/3/2017  1:51 PM Creation Time:  11/3/2017  1:51 PM    Status:  Signed :  Benoit Rai (Licensed Acupuncturist)         Acupuncture Clinical Internship Intake and Treatment Documentation   [facility]    Date:  11/3/2017  Patient Name:  Palomo Zapata   YOB: 1945     Repeat Patient:  no  Has patient had acupoint/acupressure treatment before:  yes    Signed consent placed in the medical record:  yes  Patient/Family verbalizes understanding of risks and benefits:  yes  Required information provided to patient:  yes    Diagnosis:  S/P total knee arthroplasty, left [Z96.652]    Patient condition and treatment:  Pain in left knee  Reason for Intervention Today/Chief Complaint:  Pain in left knee    Isolation:  No  Type:  None    PRE-SCORE:  moderate    Other Western medical information:  N/A    Medications[SW1.1]    Current Facility-Administered Medications:      cephALEXin (KEFLEX) capsule 500 mg, 500 mg, Oral, Daily, Robert Crane MD, 500 mg at 11/03/17 0925     NaCl 0.9 % 8.95 mL with ropivacaine (NAROPIN) 200 mg, EPINEPHrine (ADRENALIN) 300 mcg, ketorolac (TORADOL) 15 mg, morphine 2.5 mg, , INTRA-ARTICULAR, Once, Karlos Hill MD     lidocaine (LMX4) kit, , Topical, Q1H PRN, Spike Watkins MD     sodium chloride (PF)  "0.9% PF flush 3 mL, 3 mL, Intracatheter, Q1H PRN, Spiek Watkins MD     sodium chloride (PF) 0.9% PF flush 3 mL, 3 mL, Intracatheter, Q8H, Spike Watkins MD     lactated ringers infusion, , Intravenous, Continuous, Spike Watkins MD, Last Rate: 0 mL/hr at 11/02/17 1039     bupivacaine liposome (EXPAREL) LONG ACTING injection was administered into the infiltration site to produce postsurgical analgesia. Duration of action is up to 72 hours, and other \"sabra\" medications should not be given for 96 hours with the exception of the lidocaine 5% patch (LIDODERM) and the lidocaine 10mg in potassium infusions. This entry is for INFORMATION ONLY., , Does not apply, Continuous PRN, Zaire Waddell MD     atorvastatin (LIPITOR) tablet 10 mg, 10 mg, Oral, Daily at 8 pm, Karlos Hill MD, 10 mg at 11/02/17 1952     fluticasone furoate (ARNUITY ELLIPTA) 100 MCG/ACT inhalation powder 1 puff, 1 puff, Inhalation, Daily, Karlos Hill MD, 1 puff at 11/03/17 0753     finasteride (PROSCAR) tablet 5 mg, 5 mg, Oral, Daily, Karlos Hill MD, 5 mg at 11/03/17 0807     tamsulosin (FLOMAX) capsule 0.4 mg, 0.4 mg, Oral, Daily, Karlos Hill MD     lidocaine 1 % 1 mL, 1 mL, Other, Q1H PRN, Karlos Hill MD     lidocaine (LMX4) kit, , Topical, Q1H PRN, Karlos Hill MD     sodium chloride (PF) 0.9% PF flush 3 mL, 3 mL, Intracatheter, Q1H PRN, Karlos Hill MD     sodium chloride (PF) 0.9% PF flush 3 mL, 3 mL, Intracatheter, Q8H, Karlos Hill MD     0.9% sodium chloride infusion, , Intravenous, Continuous, Abilio Patten MD, Last Rate: 125 mL/hr at 11/03/17 0808     benzocaine-menthol (CEPACOL) 15-3.6 MG lozenge 1-2 lozenge, 1-2 lozenge, Buccal, Q1H PRN, Karlos Hill MD     naloxone (NARCAN) injection 0.1-0.4 mg, 0.1-0.4 mg, Intravenous, Q2 Min PRN, Karlos Hill MD     acetaminophen (TYLENOL) tablet 975 mg, 975 mg, Oral, Q8H, Karlos Hill MD, 975 mg at " 11/03/17 0925     [START ON 11/5/2017] acetaminophen (TYLENOL) tablet 650 mg, 650 mg, Oral, Q4H PRN, Karlos Hill MD     oxyCODONE IR (ROXICODONE) tablet 5-10 mg, 5-10 mg, Oral, Q4H PRN, Karlos Hill MD, 5 mg at 11/03/17 1326     diphenhydrAMINE (BENADRYL) solution 12.5 mg, 12.5 mg, Oral, Q6H PRN **OR** diphenhydrAMINE (BENADRYL) injection 12.5 mg, 12.5 mg, Intravenous, Q6H PRN, Karlos Hill MD     ondansetron (ZOFRAN-ODT) ODT tab 4 mg, 4 mg, Oral, Q6H PRN **OR** ondansetron (ZOFRAN) injection 4 mg, 4 mg, Intravenous, Q6H PRN, Karlos Hill MD     prochlorperazine (COMPAZINE) injection 5 mg, 5 mg, Intravenous, Q6H PRN **OR** prochlorperazine (COMPAZINE) tablet 5 mg, 5 mg, Oral, Q6H PRN, Karlos Hill MD     metoclopramide (REGLAN) tablet 5 mg, 5 mg, Oral, Q6H PRN **OR** metoclopramide (REGLAN) injection 5 mg, 5 mg, Intravenous, Q6H PRN, Karlos Hill MD     senna-docusate (SENOKOT-S;PERICOLACE) 8.6-50 MG per tablet 1-2 tablet, 1-2 tablet, Oral, BID, Karlos Hill MD, 2 tablet at 11/03/17 0750     melatonin tablet 1 mg, 1 mg, Oral, At Bedtime PRN, Karlos Hill MD     escitalopram (LEXAPRO) tablet 20 mg, 20 mg, Oral, Daily, Robert Crane MD, 20 mg at 11/03/17 0750     latanoprost (XALATAN) 0.005 % ophthalmic solution 1 drop, 1 drop, Left Eye, At Bedtime, Robert Crane MD, 1 drop at 11/02/17 2327     levothyroxine (SYNTHROID/LEVOTHROID) half-tab 125 mcg, 125 mcg, Oral, Daily, Robert Crane MD, 125 mcg at 11/03/17 0750     aspirin EC EC tablet 325 mg, 325 mg, Oral, Daily, Karlos Hill MD, 325 mg at 11/03/17 0750[SW1.2]  Current Outpatient Prescriptions   Medication Sig Dispense Refill     oxyCODONE IR (ROXICODONE) 5 MG tablet Take 1-2 tablets (5-10 mg) by mouth every 4 hours as needed for moderate to severe pain 80 tablet 0     [START ON 11/5/2017] acetaminophen (TYLENOL) 325 MG tablet Take 2 tablets (650 mg) by mouth every 4 hours as needed for other (surgical  pain) 100 tablet 1     aspirin  MG EC tablet Take 1 tablet (325 mg) by mouth daily for 28 days 28 tablet 0     senna-docusate (SENOKOT-S;PERICOLACE) 8.6-50 MG per tablet Take 1-2 tablets by mouth 2 times daily 40 tablet 1       Pre-Treatment Assessment  Chief Complaint/ Reason for Intervention Today:  Left Knee Pain   Chief Complaint Pre-Score:  moderate   Describe:  Dull, Achy Pain in left leg. Does not radiate.   Pain Location:  Left Knee   Pre Session Pain:  Moderate  Pre Session Anxiety:  Mild  Pre Session Nausea:  None    10 Traditional Chinese Medicine Assessment Questions  - Cold/ Heat:  Neutral   - Sweat:  None   - Headaches/Body aches:  Shoulder Tension, No headache  - Chest/Abdomen:  No chest pains or abdominal issues  - Digestion:  No issues   - Bowel Movement/Urination:  Once a day formed, easy to pass, no odor, pain, or blood. Urination is frequently due to history of bladder cancer.  - Hearing/Vision:  No hearing problems but some floaters  - Sleep (prior to hospital):  6 hours, does not wake up rested, trouble falling asleep, frequent urination, Some dreams.   - Energy:  Low energy.   - Emotions:  Some anxiety and depression. Raising thoughts but no harmful thoughts.   - Ob Gyn:  No history of prostate cancer  - Miscellaneous:  N/A    Traditional Chinese Medicine Assessment  - TONGUE:  Pale, Puffy, Fissures down the center, No coat  - PULSE:  Slippery   - OBSERVATIONS:  A little anxious.      Traditional Chinese Medicine Diagnosis  - BRANCH:  Blood Deficiency  - ROOT:  Kidney Yin and Yang Deficiency      Traditional Chinese Medicine Treatment  - ACUPUNCTURE:  Right Ear: Cuevas Men, Ki, Juliette, Knee  Left Ear: Cuevas Men, [place].   PC 6 on the left     Magnet informed consent signed and given:  no    Post Treatment Assessment  Chief complaint post score:  better  Post Session Observation:  Relaxed  Patient/Family Education:  Continue treatment and eat more red/black foods  Verbal information  provided:  yes  Written information provided:  yes  All questions answered at time of treatment:  yes    Treatment/Procedure(s) performed by:  Shawn Ramos    Date: 11/3/2017[SW1.1]     [facility]  Supervising acupuncturist:  Benoit Rai LAc [facility]  [place] Acupuncture license #: [id number]  P: [phone][CF1.1]       Revision History        User Key Date/Time User Provider Type Action    > CF1.1 11/3/2017  3:42 PM Benoit Rai Licensed Acupuncturist Sign     SW1.2 11/3/2017  2:06 PM Shawn Ramos Licensed Acupuncturist Sign     SW1.1 11/3/2017  1:51 PM Shawn Ramos Licensed Acupuncturist             Progress Notes by Randi Riddle LSW at 11/3/2017 11:44 AM     Author:  Randi Riddle LSW Service:  Social Work Author Type:      Filed:  11/3/2017  2:18 PM Date of Service:  11/3/2017 11:44 AM Creation Time:  11/3/2017 11:44 AM    Status:  Addendum :  Randi Riddle LSW ()         Social Work: Assessment with Discharge Plan    Patient Name:  Palomo Zapata  :  1945  Age:  72 year old  MRN:  [id number]  Risk/Complexity Score:  0   Completed assessment with:  Patient and pt's wife Ema     Presenting Information   Reason for Referral:  Discharge plan  Date of Intake:  November 3, 2017  Referral Source:  Chart Review  Decision Maker:  Patient   Alternate Decision Maker:  Patients wife   Health Care Directive:  Copy in Chart  Living Situation:  House-Patient lives with his wife in a town home. Patient spends the majority of his time in the lower level, which has 15  steps to get up and down. Pt's wife reported that she brings patients meals down to him and that patient has not left the house much in the last year d/t only being able to use one leg. Patient's wife reported that they have two walkers, a transport wheelchair and shower/bathroom DME equipment at home. Patient has not driven in the past year so pt's wife transports pt's to  "appointments as needed. Pt's wife is retired and able to provide support to patient as needed.    Previous Functional Status:  Patients wife provided patient assistance with some ADL's and IDL's   Patient and family understanding of hospitalization:  Appropriate for restorative care.   Cultural/Language/Spiritual Considerations:  None reported. Pt is english speaking.   Adjustment to Illness:  Patient reported that he has been feeling \"trapped\" the past months, d/t not being able to ambulate. Patient reported that he has dealt with depression but d/t ongoing medical issues, pt feels his depression has increased. Pt did express that he is hopeful that after this admissions he will have a successful recovery.     Physical Health  Reason for Admission:    1. Status post knee surgery      Services Needed/Recommended:  TCU    Mental Health/Chemical Dependency  Diagnosis:  Patient reported that he has a hx. Of severe depression and has a hx. Of CD. 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 Patient also expressed interest in talking with a health psychologist while inpatient. SW let NP know, who put in consult.[RB1.2]     Support System  Significant relationship at present time:[RB1.1]  Patients wife, Ema[RB1.2]  Family of origin is available for support:[RB1.1]  Patients daughter Madison who lives in [place][RB1.2]   Other support available:[RB1.1]  Yes, Friends and sister in law[RB1.2]   Gaps in support system:[RB1.1]  None reported[RB1.2]   Patient is caregiver to:[RB1.1]  None[RB1.2]     Provider Information   Primary Care Physician:  [facility], [place]   [phone]   Clinic:  [address] [address]      :[RB1.1]  None reported[RB1.2]     Financial   Income " Source:[RB1.1]  SSI, MCFP[RB1.2]   Financial Concerns:[RB1.1]  None reported[RB1.2]   Insurance:    Payor/Plan Subscriber Name Rel Member # Group #   MEDICARE - MEDICARE F* JANICE MCCOY  [id number]       ATTN CLAIMS, [address]   BCBS - BCBS PLATINUM * JANICE MCCOY  [id number]      [address] [address]       Discharge Plan   Patient and family discharge goal:[RB1.1]  TCU[RB1.2]  Provided education on discharge plan:[RB1.1]  YES[RB1.2]  Patient agreeable to discharge plan:[RB1.1]  YES[RB1.2]  A list of Medicare Certified Facilities was provided to the patient and/or family to encourage patient choice. Patient's choices for facility are:[RB1.1]  Yes, Patient and pt's wife requested referrals be sent to 1. [facility] 2. [facility] 3. [facility][RB1.2]   Will NH provide Skilled rehabilitation or complex medical:[RB1.1]  YES[RB1.2]  General information regarding anticipated insurance coverage and possible out of pocket cost was discussed. Patient and patient's family are aware patient may incur the cost of transportation to the facility, pending insurance payment:[RB1.1] YES[RB1.2]  Barriers to discharge:[RB1.1]  Medical Stability[RB1.2]     Discharge Recommendations   Anticipated Disposition:[RB1.1]  Facility:  [facility][RB1.2]  Transportation Needs:[RB1.1]  Family:  Patients wife will provide ride[RB1.2]      Additional comments[RB1.1]   SW sent referrals to requested facilities listed in order of patients preference. Patient was accepted to both [facility] and [facility]. Patient has requested to discharge to [facility] TCU once medically stable. SW did speak with admissions at [facility] who is anticipating DC on Sunday.[RB1.2]   [phone][RB1.3]  Disposition:   1.[RB1.2][facility].[RB1.4] [facility][RB1.2[facility][RB1.4]- Weekend RN supervisor: [phone] F: [phone]    Referrals Discontinued:  2.[facility]-Patient was accepted at facility but has chosen different TCU.  "Updated admissions at [facility].   3.[RB1.2] [facility][RB1.4] Home     BRANDY Salazar  [address]   Phone: [phone]  Pager: [phone][RB1.2]         Revision History        User Key Date/Time User Provider Type Action    > RB1.3 11/3/2017  2:18 PM Randi Riddle LSW  Addend     RB1.2 11/3/2017  1:08 PM Randi Riddle LSW  Sign     RB1.1 11/3/2017 12:17 PM Randi Riddle LSW       RB1.4 11/3/2017 11:44 AM Randi Riddle LSW              Progress Notes by Robert Crane MD at 11/3/2017 11:23 AM     Author:  Robert Crane MD Service:  Hospitalist Author Type:  Physician    Filed:  11/3/2017 12:29 PM Date of Service:  11/3/2017 11:23 AM Creation Time:  11/3/2017 12:23 PM    Status:  Signed :  Robert Crane MD (Physician)         [facility]   Internal Medicine Daily Note           Interval History/Events     Overnight events reviewed  Reports doing well  No fever, chills nausea, vomiting  Pain controlled       Review of Systems        4 point ROS including Respiratory, CV, GI and , other than that noted above is negative      Medications   I have reviewed current medications  in the \"current medication\" section of Epic.  Relevant changes include:     Physical Exam   General:       Vital signs:    Blood pressure (!) 88/49, temperature 98.2  F (36.8  C), temperature source Oral, resp. rate 18, height 1.829 m (6'), weight 94.6 kg (208 lb 8.9 oz), SpO2 93 %.  Estimated body mass index is 28.29 kg/(m^2) as calculated from the following:    Height as of this encounter: 1.829 m (6').    Weight as of this encounter: 94.6 kg (208 lb 8.9 oz).      Intake/Output Summary (Last 24 hours) at 11/03/17 1223  Last data filed at 11/03/17 0508   Gross per 24 hour   Intake           1426.7 ml   Output             1230 ml   Net            196.7 ml      HEENT: No icterus, no pallor  Cardiovascular: S1, S2 normal.   Respiratory: " B/L CTA  Abdomen: Soft, NT, BS+  Neurology: Alert awake, and oriented. No tremors.  Extremities: Right LE amputation.  Left knee dressing in place.      Laboratory and Imaging Studies     I have reviewed  laboratory and imaging studies in the Epic. Pertinent findings are as below:    BMP  Recent Labs  Lab 11/03/17  0624 11/02/17  0909     --    POTASSIUM 4.0 4.0   CHLORIDE 105  --    MÓNICA 7.2*  --    CO2 25  --    BUN 23  --    CR 1.16 1.06   * 98     CBC  Recent Labs  Lab 11/03/17  0624   WBC 10.1   RBC 3.50*   HGB 9.9*   HCT 30.4*   MCV 87   MCH 28.3   MCHC 32.6   RDW 14.4        INRNo lab results found in last 7 days.  LFTsNo lab results found in last 7 days.   PANCNo lab results found in last 7 days.        Impression/Plan        72 year old year old male with hx of Hypothyroidism, Hyperlipidemia,Bladder and Prostate cancer, Depression is being admitted s/p Left TKA on 11/02/2017     # S/P s/p Left TKA on 11/02/2017 :  Management primarily per Ortho team.  - Wound cares, Dressings, Surgical pain management, DVT Prophylaxis  per primary team.   - Monitor anemia, hemodynamics, Input/Output  - Encourage Incentive spirometry  - Laxatives for constipation prophylaxis    # Anemia: Most likely due to acute blood loss, hemodilution  Hg 9.9. On 11/03  - Transfuse if Hg < 7 gm/dl  - Hg in AM    # Hx of Hypothyroidism: PTA on Atorvastatin   - Continue     # Hx of Anxiety, Depression: PTA on Escitalopram  - Continue   # Hx of Hyperlipidemia: PTA no Atorvastatin  - Continue  # Hx of Asthma: PTA on Beclomethasone BID. Reports good control     # Hx of bladder/Prostate cancer: PTA on Tamsulosin, and Finasteride  - Continue     # UTI prevention: Patient reports that his Urologist wanted him to be on Cephalexin for 2 weeks from surgery as preventive measure..   - Cephalexin 500 mg daily      Addendum:   Asymptomatic hypotension: 500 ml NS bolus.           Robert Crane  Internal Medicine/[facility]  [facility]  [phone]                          Pt's care was discussed with bedside RN, patient and  during Care Team Rounds.               Robert Crane MD  Hospitalist ( Internal medicine)  Pager: [phone][PD1.1]        Revision History        User Key Date/Time User Provider Type Action    > PD1.1 11/3/2017 12:29 PM Robert Crane MD Physician Sign            Progress Notes by Carmela Romeo Pt, PT at 11/3/2017 11:55 AM     Author:  Carmela Romeo Pt, PT Service:  (none) Author Type:  Physical Therapist    Filed:  11/3/2017 11:55 AM Date of Service:  11/3/2017 11:55 AM Creation Time:  11/3/2017 11:55 AM    Status:  Signed :  Carmela Romeo Pt, PT (Physical Therapist)          11/03/17 0836   Quick Adds   Type of Visit Initial PT Evaluation       Present no   Language English   Living Environment   Lives With spouse   Living Arrangements house   Home Accessibility stairs to enter home   Number of Stairs to Enter Home 3   Number of Stairs Within Home 20   Stair Railings at Home inside, present on right side;outside, present at both sides   Transportation Available car;family or friend will provide   Self-Care   Dominant Hand right   Usual Activity Tolerance moderate   Current Activity Tolerance fair   Regular Exercise other (see comments)  (Leg lifts for strengthening 1x a day)   Equipment Currently Used at Home tub bench;walker, rolling;other (see comments)  (prosthetic right LE)   Activity/Exercise/Self-Care Comment Pt reported being independent with all ADLs at baseline.    Functional Level Prior   Ambulation 1-->assistive equipment   Transferring 1-->assistive equipment   Toileting 1-->assistive equipment   Bathing 1-->assistive equipment   Dressing 0-->independent   Eating 0-->independent   Communication 0-->understands/communicates without difficulty   Swallowing 0-->swallows foods/liquids without difficulty   Cognition 0 - no cognition issues reported    Fall history within last six months yes   Number of times patient has fallen within last six months 1   Which of the above functional risks had a recent onset or change? ambulation;transferring   Prior Functional Level Comment Pt reported using FWW prior to surgery.  Pt has been using a new prosthetic for the past 2 months.    General Information   Onset of Illness/Injury or Date of Surgery - Date 11/02/17   Referring Physician Karlos Hill    Patient/Family Goals Statement Pt would like to be able wal.    Pertinent History of Current Problem (include personal factors and/or comorbidities that impact the POC) Pt is s/p left TKA.  PMH includes: asthma, thyroid disease, depressive disorder, cancer, anxiety, and s/p AKA.     Precautions/Limitations fall precautions  (no pillow under left knee)   Weight-Bearing Status - LUE full weight-bearing   Weight-Bearing Status - RUE full weight-bearing   Weight-Bearing Status - LLE weight-bearing as tolerated   Weight-Bearing Status - RLE full weight-bearing   General Observations Pt supine in bed at start of PT session with CPM in place.    General Info Comments Pt currently getting IV fluids, hemovac, and on capnography.    Cognitive Status Examination   Orientation other (see comments)  (Not tested)   Level of Consciousness alert   Follows Commands and Answers Questions 100% of the time;able to follow multistep instructions   Personal Safety and Judgment intact   Memory intact   Pain Assessment   Patient Currently in Pain Yes, see Vital Sign flowsheet   Integumentary/Edema   Integumentary/Edema other (describe)   Integumentary/Edema Comments Incision not observed,  ace wrap in place.    Posture    Posture Forward head position;Protracted shoulders   Range of Motion (ROM)   ROM Comment Left knee ROM 0-15-74. pt demonstrated functional right hip ROM.    Strength   Strength Comments LE strength not formally tested.  Pt is able to lift left LE in/out of bed and complete a SLR.   "  Bed Mobility   Bed Mobility Comments Supine to/from sitting with HOB elevated and CGA x 1.    Transfer Skills   Transfer Comments Sit to/from standing from EOB with FWW and min to mod A x 2.    Gait   Gait Comments Not initiated.    Balance   Balance Comments Pt demonstrated good sitting balance at EOB.  Pt demonstrated fair standing balance with FWW.   Sensory Examination   Sensory Perception Comments Pt has no c/o numbness/tingling.    General Therapy Interventions   Planned Therapy Interventions bed mobility training;gait training;transfer training;strengthening;home program guidelines   Intervention Comments Bed moblity, transfers, and LE strengthening/ROM initiated.    Clinical Impression   Criteria for Skilled Therapeutic Intervention yes, treatment indicated   PT Diagnosis Decreased strength, decreased ROM, and impaired functional mobility.    Influenced by the following impairments Post-op pain, weakness, and decreased ROM.    Functional limitations due to impairments Impaired bed mobility, transfers, and gait.    Clinical Presentation Stable/Uncomplicated   Clinical Presentation Rationale Pt is a 73 y/o male s/p left TKA.  PMH includes: asthma, thyroid disease, depressive disorder, cancer, anxiety, and s/p AKA.  Personal factors include: stairs to get into the home and within the home, but has full time assist at home.    Clinical Decision Making (Complexity) Low complexity   Therapy Frequency` 2 times/day   Predicted Duration of Therapy Intervention (days/wks) 4 days   Anticipated Equipment Needs at Discharge other (see comments)  (No equipment needs)   Anticipated Discharge Disposition Transitional Care Facility   Risk & Benefits of therapy have been explained Yes   Patient, Family & other staff in agreement with plan of care Yes   [facility] AM-PAC TM \"6 Clicks\"   2016, Trustees of [facility], under license to [employer].  All rights reserved.   6 Clicks Short Forms Basic Mobility " "Inpatient Short Form   [facility] AM-PAC  \"6 Clicks\" V.2 Basic Mobility Inpatient Short Form   1. Turning from your back to your side while in a flat bed without using bedrails? 4 - None   2. Moving from lying on your back to sitting on the side of a flat bed without using bedrails? 3 - A Little   3. Moving to and from a bed to a chair (including a wheelchair)? 2 - A Lot   4. Standing up from a chair using your arms (e.g., wheelchair, or bedside chair)? 2 - A Lot   5. To walk in hospital room? 2 - A Lot   6. Climbing 3-5 steps with a railing? 1 - Total   Basic Mobility Raw Score (Score out of 24.Lower scores equate to lower levels of function) 14   Total Evaluation Time   Total Evaluation Time (Minutes) 10[RH1.1]        Revision History        User Key Date/Time User Provider Type Action    > RH1.1 11/3/2017 11:55 AM Carmela Romeo Pt, PT Physical Therapist Sign            Progress Notes by Erika Kay OT at 11/3/2017 10:31 AM     Author:  Erika Kay OT Service:  (none) Author Type:  Occupational Therapist    Filed:  11/3/2017 10:31 AM Date of Service:  11/3/2017 10:31 AM Creation Time:  11/3/2017 10:31 AM    Status:  Signed :  Erika Kay OT (Occupational Therapist)          11/03/17 0939   Quick Adds   Type of Visit Initial Occupational Therapy Evaluation   Living Environment   Lives With spouse   Living Arrangements house   Number of Stairs to Enter Home 3   Number of Stairs Within Home 20   Transportation Available car;family or friend will provide   Living Environment Comment Walk-in shower, shower chair, RTS with handrails   Self-Care   Usual Activity Tolerance moderate   Current Activity Tolerance fair   Regular Exercise no   Equipment Currently Used at Home walker, standard   Activity/Exercise/Self-Care Comment Patient independent in ADLs/mobility with walker, patient reports for last year has struggled due to skin breakdown on R AKA and unable to wear prosthesis.  Patient did " not drive for last year due to this reason.  Patient was fit with new prosthesis one month ago following plastic surgery to stump.   Functional Level Prior   Ambulation 1-->assistive equipment   Transferring 1-->assistive equipment   Toileting 1-->assistive equipment   Bathing 1-->assistive equipment   Dressing 1-->assistive equipment   Eating 1-->assistive equipment   Communication 0-->understands/communicates without difficulty   Swallowing 0-->swallows foods/liquids without difficulty   Cognition 0 - no cognition issues reported   Fall history within last six months yes   Number of times patient has fallen within last six months 1   Prior Functional Level Comment Has reacher, LH shoe horn   General Information   Onset of Illness/Injury or Date of Surgery - Date 11/02/17   Referring Physician Dr. Hill   Patient/Family Goals Statement reports MD [facility]   Additional Occupational Profile Info/Pertinent History of Current Problem POD #1 s/p L TKA, R AKA 6 years ago   Precautions/Limitations no known precautions/limitations   Weight-Bearing Status - LLE weight-bearing as tolerated   General Observations On capno   Cognitive Status Examination   Cognitive Comment No cognitive concerns   Sensory Examination   Sensory Comments No N/T noted   Pain Assessment   Patient Currently in Pain Yes, see Vital Sign flowsheet   Range of Motion (ROM)   ROM Comment B UE AROM WFL   Strength   Strength Comments B UE MMT WFL   Mobility   Bed Mobility Bed mobility skill: Sit to supine;Bed mobility skill: Supine to sit   Bed Mobility Skill: Sit to Supine   Level of [place]: Sit/Supine contact guard   Bed Mobility Skill: Supine to Sit   Level of [place]: Supine/Sit contact guard   Transfer Skill: Bed to Chair/Chair to Bed   Level of [place]: Bed to Chair unable to perform   Transfer Skill: Sit to Stand   Level of [place]: Sit/Stand unable to perform   Transfer Skill: Toilet Transfer   Level of [place]: Toilet  "unable to perform   Bathing   Level of [place] unable to perform   Upper Body Dressing   Level of [place]: Dress Upper Body independent   Lower Body Dressing   Level of [place]: Dress Lower Body unable to perform   Toileting   Level of [place]: Toilet dependent (less than 25% patients effort)   Grooming   Level of [place]: Grooming independent   Eating/Self Feeding   Level of [place]: Eating independent   Activities of Daily Living Analysis   Impairments Contributing to Impaired Activities of Daily Living pain;ROM decreased;strength decreased   General Therapy Interventions   Planned Therapy Interventions ADL retraining   Clinical Impression   Criteria for Skilled Therapeutic Interventions Met yes, treatment indicated   OT Diagnosis impaired self-cares/mobility   Influenced by the following impairments pain; decreased ROM   Assessment of Occupational Performance 1-3 Performance Deficits   Identified Performance Deficits dressing, bathing, toileting   Clinical Decision Making (Complexity) Low complexity   Therapy Frequency daily   Predicted Duration of Therapy Intervention (days/wks) 2-3 days   Anticipated Discharge Disposition Transitional Care Facility   Risks and Benefits of Treatment have been explained. Yes   Patient, Family & other staff in agreement with plan of care Yes   [facility] TM \"6 Clicks\"   2016, Trustees of [facility], under license to [employer].  All rights reserved.   6 Clicks Short Forms Daily Activity Inpatient Short Form   [facility]  \"6 Clicks\" Daily Activity Inpatient Short Form   1. Putting on and taking off regular lower body clothing? 2 - A Lot   2. Bathing (including washing, rinsing, drying)? 2 - A Lot   3. Toileting, which includes using toilet, bedpan or urinal? 2 - A Lot   4. Putting on and taking off regular upper body clothing? 4 - None   5. Taking care of personal grooming such as brushing teeth? 4 - None   6. " Eating meals? 4 - None   Daily Activity Raw Score (Score out of 24.Lower scores equate to lower levels of function) 18   Total Evaluation Time   Total Evaluation Time (Minutes) 10[MW1.1]        Revision History        User Key Date/Time User Provider Type Action    > MW1.1 11/3/2017 10:31 AM Erika Kay OT Occupational Therapist Sign            Progress Notes by Rosanne Ramirez MD at 11/3/2017  7:39 AM     Author:  Rosanne Ramirez MD Service:  Orthopedics Author Type:  Resident    Filed:  11/3/2017  7:41 AM Date of Service:  11/3/2017  7:39 AM Creation Time:  11/3/2017  7:39 AM    Status:  Signed :  Rosanne Ramirez MD (Resident)         Orthopaedic Surgery Progress Note     Subjective/Events: No acute overnight events. Pain controlled. Tolerating PO, le in place. Numbness, tingling has resolved.    Objective:  BP 94/52  Temp 98.2  F (36.8  C) (Oral)  Resp 13  Ht 1.829 m (6')  Wt 94.6 kg (208 lb 8.9 oz)  SpO2 93%  BMI 28.29 kg/m2  Temp (24hrs), Av  F (36.7  C), Min:97.6  F (36.4  C), Max:98.6  F (37  C)      Gen: A&Ox3, no acute distress  CV: 2+ dp/pt pulses, capillary refill < 2sec  Resp: breathing equal and non-labored, no wheezing  RLE AKA  LLE: dressing c/d/i, 5/5 TA GSC EHL FHL, SILT sp, dp, tib, s, s nn; 2+ dp pulse    Assessment: 72M w R AKA and hx of bladder cancer (condom caths at baseline)  Plan:  Activity: Up with assist.  Weight bearing status: AT   Le: remove POD2 and transition back to condom caths  Antibiotics: Clinda x 24 hours.  Diet: Begin with clear fluids and progress diet as tolerated.  Bowel regimen. Anti-emetics PRN.    DVT prophylaxis:  mg  Dressing: changed today  Drains: removed today  Pain management: transition from IV to orals as tolerated.    Physical Therapy/Occupational Therapy  Follow-up: Clinic with Dr. Dahl in 2 weeks, x-rays needed.    Dispo: DC POD3 pending PT    Rosanne Ramirez MD  PGY-4  [phone][LM1.1]           Revision  History        User Key Date/Time User Provider Type Action    > LM1.1 11/3/2017  7:41 AM Rosanne Berman MD Resident Sign                  Procedure Notes     No notes of this type exist for this encounter.         Progress Notes - Therapies (Notes from 11/02/17 through 11/05/17)      Progress Notes by Carmela Romeo Pt, PT at 11/3/2017 11:55 AM     Author:  Carmela Romeo Pt, PT Service:  (none) Author Type:  Physical Therapist    Filed:  11/3/2017 11:55 AM Date of Service:  11/3/2017 11:55 AM Creation Time:  11/3/2017 11:55 AM    Status:  Signed :  Carmela Romeo Pt, PT (Physical Therapist)          11/03/17 0836   Quick Adds   Type of Visit Initial PT Evaluation       Present no   Language English   Living Environment   Lives With spouse   Living Arrangements house   Home Accessibility stairs to enter home   Number of Stairs to Enter Home 3   Number of Stairs Within Home 20   Stair Railings at Home inside, present on right side;outside, present at both sides   Transportation Available car;family or friend will provide   Self-Care   Dominant Hand right   Usual Activity Tolerance moderate   Current Activity Tolerance fair   Regular Exercise other (see comments)  (Leg lifts for strengthening 1x a day)   Equipment Currently Used at Home tub bench;walker, rolling;other (see comments)  (prosthetic right LE)   Activity/Exercise/Self-Care Comment Pt reported being independent with all ADLs at baseline.    Functional Level Prior   Ambulation 1-->assistive equipment   Transferring 1-->assistive equipment   Toileting 1-->assistive equipment   Bathing 1-->assistive equipment   Dressing 0-->independent   Eating 0-->independent   Communication 0-->understands/communicates without difficulty   Swallowing 0-->swallows foods/liquids without difficulty   Cognition 0 - no cognition issues reported   Fall history within last six months yes   Number of times patient has fallen within last six  months 1   Which of the above functional risks had a recent onset or change? ambulation;transferring   Prior Functional Level Comment Pt reported using FWW prior to surgery.  Pt has been using a new prosthetic for the past 2 months.    General Information   Onset of Illness/Injury or Date of Surgery - Date 11/02/17   Referring Physician Karlos Hill    Patient/Family Goals Statement Pt would like to be able wal.    Pertinent History of Current Problem (include personal factors and/or comorbidities that impact the POC) Pt is s/p left TKA.  PMH includes: asthma, thyroid disease, depressive disorder, cancer, anxiety, and s/p AKA.     Precautions/Limitations fall precautions  (no pillow under left knee)   Weight-Bearing Status - LUE full weight-bearing   Weight-Bearing Status - RUE full weight-bearing   Weight-Bearing Status - LLE weight-bearing as tolerated   Weight-Bearing Status - RLE full weight-bearing   General Observations Pt supine in bed at start of PT session with CPM in place.    General Info Comments Pt currently getting IV fluids, hemovac, and on capnography.    Cognitive Status Examination   Orientation other (see comments)  (Not tested)   Level of Consciousness alert   Follows Commands and Answers Questions 100% of the time;able to follow multistep instructions   Personal Safety and Judgment intact   Memory intact   Pain Assessment   Patient Currently in Pain Yes, see Vital Sign flowsheet   Integumentary/Edema   Integumentary/Edema other (describe)   Integumentary/Edema Comments Incision not observed,  ace wrap in place.    Posture    Posture Forward head position;Protracted shoulders   Range of Motion (ROM)   ROM Comment Left knee ROM 0-15-74. pt demonstrated functional right hip ROM.    Strength   Strength Comments LE strength not formally tested.  Pt is able to lift left LE in/out of bed and complete a SLR.    Bed Mobility   Bed Mobility Comments Supine to/from sitting with HOB elevated and CGA x 1.   "  Transfer Skills   Transfer Comments Sit to/from standing from EOB with FWW and min to mod A x 2.    Gait   Gait Comments Not initiated.    Balance   Balance Comments Pt demonstrated good sitting balance at EOB.  Pt demonstrated fair standing balance with FWW.   Sensory Examination   Sensory Perception Comments Pt has no c/o numbness/tingling.    General Therapy Interventions   Planned Therapy Interventions bed mobility training;gait training;transfer training;strengthening;home program guidelines   Intervention Comments Bed moblity, transfers, and LE strengthening/ROM initiated.    Clinical Impression   Criteria for Skilled Therapeutic Intervention yes, treatment indicated   PT Diagnosis Decreased strength, decreased ROM, and impaired functional mobility.    Influenced by the following impairments Post-op pain, weakness, and decreased ROM.    Functional limitations due to impairments Impaired bed mobility, transfers, and gait.    Clinical Presentation Stable/Uncomplicated   Clinical Presentation Rationale Pt is a 73 y/o male s/p left TKA.  PMH includes: asthma, thyroid disease, depressive disorder, cancer, anxiety, and s/p AKA.  Personal factors include: stairs to get into the home and within the home, but has full time assist at home.    Clinical Decision Making (Complexity) Low complexity   Therapy Frequency` 2 times/day   Predicted Duration of Therapy Intervention (days/wks) 4 days   Anticipated Equipment Needs at Discharge other (see comments)  (No equipment needs)   Anticipated Discharge Disposition Transitional Care Facility   Risk & Benefits of therapy have been explained Yes   Patient, Family & other staff in agreement with plan of care Yes   [facility] AM-PAC TM \"6 Clicks\"   2016, Trustees of [facility], under license to [employer].  All rights reserved.   6 Clicks Short Forms Basic Mobility Inpatient Short Form   [facility] AM-PAC  \"6 Clicks\" V.2 Basic Mobility Inpatient Short Form "   1. Turning from your back to your side while in a flat bed without using bedrails? 4 - None   2. Moving from lying on your back to sitting on the side of a flat bed without using bedrails? 3 - A Little   3. Moving to and from a bed to a chair (including a wheelchair)? 2 - A Lot   4. Standing up from a chair using your arms (e.g., wheelchair, or bedside chair)? 2 - A Lot   5. To walk in hospital room? 2 - A Lot   6. Climbing 3-5 steps with a railing? 1 - Total   Basic Mobility Raw Score (Score out of 24.Lower scores equate to lower levels of function) 14   Total Evaluation Time   Total Evaluation Time (Minutes) 10[RH1.1]        Revision History        User Key Date/Time User Provider Type Action    > RH1.1 11/3/2017 11:55 AM Carmela Romeo Pt, PT Physical Therapist Sign            Progress Notes by Erika Kay OT at 11/3/2017 10:31 AM     Author:  Erika Kay OT Service:  (none) Author Type:  Occupational Therapist    Filed:  11/3/2017 10:31 AM Date of Service:  11/3/2017 10:31 AM Creation Time:  11/3/2017 10:31 AM    Status:  Signed :  Erika Kay OT (Occupational Therapist)          11/03/17 0939   Quick Adds   Type of Visit Initial Occupational Therapy Evaluation   Living Environment   Lives With spouse   Living Arrangements house   Number of Stairs to Enter Home 3   Number of Stairs Within Home 20   Transportation Available car;family or friend will provide   Living Environment Comment Walk-in shower, shower chair, RTS with handrails   Self-Care   Usual Activity Tolerance moderate   Current Activity Tolerance fair   Regular Exercise no   Equipment Currently Used at Home walker, standard   Activity/Exercise/Self-Care Comment Patient independent in ADLs/mobility with walker, patient reports for last year has struggled due to skin breakdown on R AKA and unable to wear prosthesis.  Patient did not drive for last year due to this reason.  Patient was fit with new prosthesis one month ago  following plastic surgery to stump.   Functional Level Prior   Ambulation 1-->assistive equipment   Transferring 1-->assistive equipment   Toileting 1-->assistive equipment   Bathing 1-->assistive equipment   Dressing 1-->assistive equipment   Eating 1-->assistive equipment   Communication 0-->understands/communicates without difficulty   Swallowing 0-->swallows foods/liquids without difficulty   Cognition 0 - no cognition issues reported   Fall history within last six months yes   Number of times patient has fallen within last six months 1   Prior Functional Level Comment Has reacher, LH shoe horn   General Information   Onset of Illness/Injury or Date of Surgery - Date 11/02/17   Referring Physician Dr. Hill   Patient/Family Goals Statement reports MD [facility]   Additional Occupational Profile Info/Pertinent History of Current Problem POD #1 s/p L TKA, R AKA 6 years ago   Precautions/Limitations no known precautions/limitations   Weight-Bearing Status - LLE weight-bearing as tolerated   General Observations On capno   Cognitive Status Examination   Cognitive Comment No cognitive concerns   Sensory Examination   Sensory Comments No N/T noted   Pain Assessment   Patient Currently in Pain Yes, see Vital Sign flowsheet   Range of Motion (ROM)   ROM Comment B UE AROM WFL   Strength   Strength Comments B UE MMT WFL   Mobility   Bed Mobility Bed mobility skill: Sit to supine;Bed mobility skill: Supine to sit   Bed Mobility Skill: Sit to Supine   Level of [place]: Sit/Supine contact guard   Bed Mobility Skill: Supine to Sit   Level of [place]: Supine/Sit contact guard   Transfer Skill: Bed to Chair/Chair to Bed   Level of [place]: Bed to Chair unable to perform   Transfer Skill: Sit to Stand   Level of [place]: Sit/Stand unable to perform   Transfer Skill: Toilet Transfer   Level of [place]: Toilet unable to perform   Bathing   Level of [place] unable to perform   Upper Body Dressing   Level  "of [place]: Dress Upper Body independent   Lower Body Dressing   Level of [place]: Dress Lower Body unable to perform   Toileting   Level of [place]: Toilet dependent (less than 25% patients effort)   Grooming   Level of [place]: Grooming independent   Eating/Self Feeding   Level of [place]: Eating independent   Activities of Daily Living Analysis   Impairments Contributing to Impaired Activities of Daily Living pain;ROM decreased;strength decreased   General Therapy Interventions   Planned Therapy Interventions ADL retraining   Clinical Impression   Criteria for Skilled Therapeutic Interventions Met yes, treatment indicated   OT Diagnosis impaired self-cares/mobility   Influenced by the following impairments pain; decreased ROM   Assessment of Occupational Performance 1-3 Performance Deficits   Identified Performance Deficits dressing, bathing, toileting   Clinical Decision Making (Complexity) Low complexity   Therapy Frequency daily   Predicted Duration of Therapy Intervention (days/wks) 2-3 days   Anticipated Discharge Disposition Transitional Care Facility   Risks and Benefits of Treatment have been explained. Yes   Patient, Family & other staff in agreement with plan of care Yes   [facility] TM \"6 Clicks\"   2016, Trustees of [facility], under license to [employer].  All rights reserved.   6 Clicks Short Forms Daily Activity Inpatient Short Form   [facility]  \"6 Clicks\" Daily Activity Inpatient Short Form   1. Putting on and taking off regular lower body clothing? 2 - A Lot   2. Bathing (including washing, rinsing, drying)? 2 - A Lot   3. Toileting, which includes using toilet, bedpan or urinal? 2 - A Lot   4. Putting on and taking off regular upper body clothing? 4 - None   5. Taking care of personal grooming such as brushing teeth? 4 - None   6. Eating meals? 4 - None   Daily Activity Raw Score (Score out of 24.Lower scores equate to lower levels of " function) 18   Total Evaluation Time   Total Evaluation Time (Minutes) 10[MW1.1]        Revision History        User Key Date/Time User Provider Type Action    > MW1.1 11/3/2017 10:31 AM Erika Kay OT Occupational Therapist Sign

## 2017-11-03 ENCOUNTER — APPOINTMENT (OUTPATIENT)
Dept: PHYSICAL THERAPY | Facility: CLINIC | Age: 72
DRG: 470 | End: 2017-11-03
Attending: ORTHOPAEDIC SURGERY
Payer: MEDICARE

## 2017-11-03 ENCOUNTER — APPOINTMENT (OUTPATIENT)
Dept: OCCUPATIONAL THERAPY | Facility: CLINIC | Age: 72
DRG: 470 | End: 2017-11-03
Attending: ORTHOPAEDIC SURGERY
Payer: MEDICARE

## 2017-11-03 LAB
ANION GAP SERPL CALCULATED.3IONS-SCNC: 5 MMOL/L (ref 3–14)
BACTERIA SPEC CULT: NO GROWTH
BUN SERPL-MCNC: 23 MG/DL (ref 7–30)
CALCIUM SERPL-MCNC: 7.2 MG/DL (ref 8.5–10.1)
CHLORIDE SERPL-SCNC: 105 MMOL/L (ref 94–109)
CO2 SERPL-SCNC: 25 MMOL/L (ref 20–32)
CREAT SERPL-MCNC: 1.16 MG/DL (ref 0.66–1.25)
ERYTHROCYTE [DISTWIDTH] IN BLOOD BY AUTOMATED COUNT: 14.4 % (ref 10–15)
GFR SERPL CREATININE-BSD FRML MDRD: 61 ML/MIN/1.7M2
GLUCOSE SERPL-MCNC: 100 MG/DL (ref 70–99)
HCT VFR BLD AUTO: 30.4 % (ref 40–53)
HGB BLD-MCNC: 9.9 G/DL (ref 13.3–17.7)
Lab: NORMAL
MCH RBC QN AUTO: 28.3 PG (ref 26.5–33)
MCHC RBC AUTO-ENTMCNC: 32.6 G/DL (ref 31.5–36.5)
MCV RBC AUTO: 87 FL (ref 78–100)
PLATELET # BLD AUTO: 189 10E9/L (ref 150–450)
POTASSIUM SERPL-SCNC: 4 MMOL/L (ref 3.4–5.3)
RBC # BLD AUTO: 3.5 10E12/L (ref 4.4–5.9)
SODIUM SERPL-SCNC: 135 MMOL/L (ref 133–144)
SPECIMEN SOURCE: NORMAL
WBC # BLD AUTO: 10.1 10E9/L (ref 4–11)

## 2017-11-03 PROCEDURE — 97165 OT EVAL LOW COMPLEX 30 MIN: CPT | Mod: GO | Performed by: OCCUPATIONAL THERAPIST

## 2017-11-03 PROCEDURE — 97530 THERAPEUTIC ACTIVITIES: CPT | Mod: GO | Performed by: OCCUPATIONAL THERAPIST

## 2017-11-03 PROCEDURE — 40000193 ZZH STATISTIC PT WARD VISIT: Performed by: PHYSICAL THERAPIST

## 2017-11-03 PROCEDURE — 97110 THERAPEUTIC EXERCISES: CPT | Mod: GP

## 2017-11-03 PROCEDURE — 97530 THERAPEUTIC ACTIVITIES: CPT | Mod: GP

## 2017-11-03 PROCEDURE — 25000125 ZZHC RX 250: Performed by: ORTHOPAEDIC SURGERY

## 2017-11-03 PROCEDURE — 25000128 H RX IP 250 OP 636: Performed by: INTERNAL MEDICINE

## 2017-11-03 PROCEDURE — 12000001 ZZH R&B MED SURG/OB UMMC

## 2017-11-03 PROCEDURE — 97161 PT EVAL LOW COMPLEX 20 MIN: CPT | Mod: GP | Performed by: PHYSICAL THERAPIST

## 2017-11-03 PROCEDURE — 97530 THERAPEUTIC ACTIVITIES: CPT | Mod: GP | Performed by: PHYSICAL THERAPIST

## 2017-11-03 PROCEDURE — 36415 COLL VENOUS BLD VENIPUNCTURE: CPT | Performed by: ORTHOPAEDIC SURGERY

## 2017-11-03 PROCEDURE — 25000132 ZZH RX MED GY IP 250 OP 250 PS 637: Mod: GY | Performed by: ORTHOPAEDIC SURGERY

## 2017-11-03 PROCEDURE — 40000133 ZZH STATISTIC OT WARD VISIT: Performed by: OCCUPATIONAL THERAPIST

## 2017-11-03 PROCEDURE — 97535 SELF CARE MNGMENT TRAINING: CPT | Mod: GO | Performed by: OCCUPATIONAL THERAPIST

## 2017-11-03 PROCEDURE — 85027 COMPLETE CBC AUTOMATED: CPT | Performed by: ORTHOPAEDIC SURGERY

## 2017-11-03 PROCEDURE — 97110 THERAPEUTIC EXERCISES: CPT | Mod: GP | Performed by: PHYSICAL THERAPIST

## 2017-11-03 PROCEDURE — A9270 NON-COVERED ITEM OR SERVICE: HCPCS | Mod: GY | Performed by: ORTHOPAEDIC SURGERY

## 2017-11-03 PROCEDURE — A9270 NON-COVERED ITEM OR SERVICE: HCPCS | Mod: GY | Performed by: INTERNAL MEDICINE

## 2017-11-03 PROCEDURE — 40000193 ZZH STATISTIC PT WARD VISIT

## 2017-11-03 PROCEDURE — S0077 INJECTION, CLINDAMYCIN PHOSP: HCPCS | Performed by: ORTHOPAEDIC SURGERY

## 2017-11-03 PROCEDURE — 80048 BASIC METABOLIC PNL TOTAL CA: CPT | Performed by: ORTHOPAEDIC SURGERY

## 2017-11-03 PROCEDURE — 25000132 ZZH RX MED GY IP 250 OP 250 PS 637: Mod: GY | Performed by: INTERNAL MEDICINE

## 2017-11-03 PROCEDURE — 99232 SBSQ HOSP IP/OBS MODERATE 35: CPT | Performed by: INTERNAL MEDICINE

## 2017-11-03 RX ORDER — ACETAMINOPHEN 325 MG/1
650 TABLET ORAL EVERY 4 HOURS PRN
Qty: 100 TABLET | Refills: 1 | Status: SHIPPED | OUTPATIENT
Start: 2017-11-05

## 2017-11-03 RX ORDER — OXYCODONE HYDROCHLORIDE 5 MG/1
5-10 TABLET ORAL EVERY 4 HOURS PRN
Qty: 80 TABLET | Refills: 0 | Status: SHIPPED | OUTPATIENT
Start: 2017-11-03 | End: 2017-11-05

## 2017-11-03 RX ORDER — CEPHALEXIN 500 MG/1
500 CAPSULE ORAL DAILY
Status: DISCONTINUED | OUTPATIENT
Start: 2017-11-03 | End: 2017-11-05 | Stop reason: HOSPADM

## 2017-11-03 RX ORDER — AMOXICILLIN 250 MG
1-2 CAPSULE ORAL 2 TIMES DAILY
Qty: 40 TABLET | Refills: 1 | Status: SHIPPED | OUTPATIENT
Start: 2017-11-03 | End: 2017-12-11

## 2017-11-03 RX ORDER — ASPIRIN 325 MG
325 TABLET, DELAYED RELEASE (ENTERIC COATED) ORAL DAILY
Qty: 28 TABLET | Refills: 0 | Status: SHIPPED | OUTPATIENT
Start: 2017-11-03 | End: 2017-12-01

## 2017-11-03 RX ADMIN — ACETAMINOPHEN 975 MG: 325 TABLET, FILM COATED ORAL at 09:25

## 2017-11-03 RX ADMIN — OXYCODONE HYDROCHLORIDE 10 MG: 5 TABLET ORAL at 21:29

## 2017-11-03 RX ADMIN — LATANOPROST 1 DROP: 50 SOLUTION OPHTHALMIC at 21:29

## 2017-11-03 RX ADMIN — OXYCODONE HYDROCHLORIDE 5 MG: 5 TABLET ORAL at 13:26

## 2017-11-03 RX ADMIN — SENNOSIDES AND DOCUSATE SODIUM 2 TABLET: 8.6; 5 TABLET ORAL at 20:23

## 2017-11-03 RX ADMIN — ESCITALOPRAM OXALATE 20 MG: 10 TABLET ORAL at 07:50

## 2017-11-03 RX ADMIN — OXYCODONE HYDROCHLORIDE 5 MG: 5 TABLET ORAL at 02:32

## 2017-11-03 RX ADMIN — CLINDAMYCIN PHOSPHATE 900 MG: 18 INJECTION, SOLUTION INTRAVENOUS at 02:32

## 2017-11-03 RX ADMIN — FLUTICASONE FUROATE 1 PUFF: 100 POWDER RESPIRATORY (INHALATION) at 07:53

## 2017-11-03 RX ADMIN — ATORVASTATIN CALCIUM 10 MG: 10 TABLET, FILM COATED ORAL at 20:23

## 2017-11-03 RX ADMIN — ACETAMINOPHEN 975 MG: 325 TABLET, FILM COATED ORAL at 01:15

## 2017-11-03 RX ADMIN — OXYCODONE HYDROCHLORIDE 5 MG: 5 TABLET ORAL at 09:25

## 2017-11-03 RX ADMIN — SODIUM CHLORIDE: 9 INJECTION, SOLUTION INTRAVENOUS at 08:08

## 2017-11-03 RX ADMIN — SODIUM CHLORIDE 500 ML: 9 INJECTION, SOLUTION INTRAVENOUS at 11:57

## 2017-11-03 RX ADMIN — OXYCODONE HYDROCHLORIDE 5 MG: 5 TABLET ORAL at 17:44

## 2017-11-03 RX ADMIN — SODIUM CHLORIDE 500 ML: 9 INJECTION, SOLUTION INTRAVENOUS at 03:47

## 2017-11-03 RX ADMIN — FINASTERIDE 5 MG: 5 TABLET, FILM COATED ORAL at 08:07

## 2017-11-03 RX ADMIN — SODIUM CHLORIDE: 9 INJECTION, SOLUTION INTRAVENOUS at 19:22

## 2017-11-03 RX ADMIN — OXYCODONE HYDROCHLORIDE 5 MG: 5 TABLET ORAL at 05:07

## 2017-11-03 RX ADMIN — ACETAMINOPHEN 975 MG: 325 TABLET, FILM COATED ORAL at 17:36

## 2017-11-03 RX ADMIN — Medication 125 MCG: at 07:50

## 2017-11-03 RX ADMIN — SENNOSIDES AND DOCUSATE SODIUM 2 TABLET: 8.6; 5 TABLET ORAL at 07:50

## 2017-11-03 RX ADMIN — SODIUM CHLORIDE: 9 INJECTION, SOLUTION INTRAVENOUS at 01:16

## 2017-11-03 RX ADMIN — CEPHALEXIN 500 MG: 500 CAPSULE ORAL at 09:25

## 2017-11-03 RX ADMIN — ASPIRIN 325 MG: 325 TABLET, DELAYED RELEASE ORAL at 07:50

## 2017-11-03 NOTE — PROGRESS NOTES
Orthopaedic Surgery Progress Note     Subjective/Events: No acute overnight events. Pain controlled. Tolerating PO, le in place. Numbness, tingling has resolved.    Objective:  BP 94/52  Temp 98.2  F (36.8  C) (Oral)  Resp 13  Ht 1.829 m (6')  Wt 94.6 kg (208 lb 8.9 oz)  SpO2 93%  BMI 28.29 kg/m2  Temp (24hrs), Av  F (36.7  C), Min:97.6  F (36.4  C), Max:98.6  F (37  C)      Gen: A&Ox3, no acute distress  CV: 2+ dp/pt pulses, capillary refill < 2sec  Resp: breathing equal and non-labored, no wheezing  RLE AKA  LLE: dressing c/d/i, 5/5 TA GSC EHL FHL, SILT sp, dp, tib, s, s nn; 2+ dp pulse    Assessment: 72M w R AKA and hx of bladder cancer (condom caths at baseline)  Plan:  Activity: Up with assist.  Weight bearing status: AT   Le: remove POD2 and transition back to condom caths  Antibiotics: Clinda x 24 hours.  Diet: Begin with clear fluids and progress diet as tolerated.  Bowel regimen. Anti-emetics PRN.    DVT prophylaxis:  mg  Dressing: changed today  Drains: removed today  Pain management: transition from IV to orals as tolerated.    Physical Therapy/Occupational Therapy  Follow-up: Clinic with Dr. Hill in 2 weeks, x-rays needed.    Dispo: DC POD3 pending PT    Rosanne Ramirez MD  PGY-4  [phone]

## 2017-11-03 NOTE — OP NOTE
OPERATION REPORT     DATE OF OPERATION: 11/3/2017     SURGEON: Karlos Hill MD.     ASSISTANT: Nikko Germain, Fellow, ANOOP Garcia    ANESTHESIOLOGIST: Anesthesiologist: Spike Watkins MD  Anesthesia Resident: Sukhjinder Littlejohn MD    ANESTHESIA: Spinal    PREOPERATIVE DIAGNOSIS: Left knee degenerative joint disease.     POSTOPERATIVE DIAGNOSIS: Left knee degenerative joint disease.     OPERATION(S) PERFORMED: Left total knee arthroplasty.     BACKGROUND:  Patient with end-stage DJD of the Left knee that has failed non operative treatment including injections, PT and NSAIDS     OPERATIVE FINDINGS: Patient with end-stage DJD of the Left knee.     OPERATIVE PROCEDURE: After informed consent was obtained, the patient brought to operating room on 11/3/2017. Spinal and Block were performed by the department of anesthesiology. The patient was sedated and then placed supine on the OR table with all bony prominences appropriately padded. A nonsterile tourniquet was placed as proximal as possible. Surgical pause was instituted with agreement between surgeon anesthesia and nursing that the Left knee was the correct operative site.  The operative lower extremity was prepped and draped in usual sterile fashion. Midline incision was made, went sharply through skin and subcutaneous tissues. A full-thickness flap was developed down to the retinaculum.  An arthrotomy was then made through the quadriceps tendon extending along the medial border of the patella and patellar tending. A small medial release was performed, as well as a debridement of the anterior fat pad and lateral meniscus.  The patella was then measured and cut to the appropriate size.  A patellar protector was placed for the remainder of the case. Patient specific guides were utilized to place the femoral cutting guide in approximately 3 degrees of flexion, and neutral to the mechanical axis.  Whitesides line and the transepicondylar axis were used to confirm  appropriate external rotation. This was cut, and the remaining osteophytes were debrided off of the femor with a rongeur.   The proximal tibia was then cut using patient specific guide, using the drop ita to confirm appropriate alignment.  Lamina spreaders were placed and the remaining meniscus was debrided.  Anesthetic cocktail was then placed in several aliquots in the posterior capsule.  The femoral and tibial trials were placed and confirmed to of appropriate size.  Stability of the knee was noted in full extension as well as varying degrees of flexion. The trial components were removed and the knee was washed with pulse irrigation and prepped for final implantation .  Components were then cemented. Care was taken to remove all excess cement. The final polyethylene component was placed and confirmed to be locked in the tibial tray.  The wound was copiously irrigated with normal saline irrigation.  A drain was placed.  The quadricepts tendon and patellar retinaculum were closed with # 1 vicryl. The skin was closed with a 2-0 vicryl deep dermal and 3-0 monocryl.    ESTIMATED BLOOD LOSS: 50 mL.       POSTOPERATIVE PLAN: is standard total knee protocol.  Wounds were dressed in usual sterile fashion. The patient was awakened from sedation and sent to PACU in stable condition.     FINAL IMPLANTS:   Implant Name Type Inv. Item Serial No.  Lot No. LRB No. Used   IMP COMP FEMORAL VANGARD BIOM LT 75MM [id number] Total Joint Component/Insert IMP COMP FEMORAL VANGARD BIOM LT 75MM [id number]  [employer] [id number] Left 1   IMP TIBIAL BEARING CR-LIPPED VANGUARD BIOM 10X87/91MM [id number] Total Joint Component/Insert IMP TIBIAL BEARING CR-LIPPED VANGUARD BIOM 10X87/91MM [id number]  [employer] [id number] Left 1   IMP COMP TIBIAL KNEE ASCENT 87MM [id number] Total Joint Component/Insert IMP COMP TIBIAL KNEE ASCENT 87MM [id number]  [employer] [id number] Left 1   Biomet  Asymmetrical Patella 34mm 8.5mm Total Joint Component/Insert   [employer]ET [id number] Left  1   BONE CEMENT SIMPLEX W/TOBRAMYCIN 6197-9-001 Cement, Bone BONE CEMENT SIMPLEX W/TOBRAMYCIN 6197-9-001   [employer] [id number] Left 2       ATTESTATION: Dr Hill was present at all critical portions of this case and immediately available at all times during the duration of the case.     Signed:  Nikko Germain 11/3/2017 5:09 PM

## 2017-11-03 NOTE — PROGRESS NOTES
"[facility], [place]   Internal Medicine Daily Note           Interval History/Events     Overnight events reviewed  Reports doing well  No fever, chills nausea, vomiting  Pain controlled       Review of Systems        4 point ROS including Respiratory, CV, GI and , other than that noted above is negative      Medications   I have reviewed current medications  in the \"current medication\" section of Epic.  Relevant changes include:     Physical Exam   General:       Vital signs:    Blood pressure (!) 88/49, temperature 98.2  F (36.8  C), temperature source Oral, resp. rate 18, height 1.829 m (6'), weight 94.6 kg (208 lb 8.9 oz), SpO2 93 %.  Estimated body mass index is 28.29 kg/(m^2) as calculated from the following:    Height as of this encounter: 1.829 m (6').    Weight as of this encounter: 94.6 kg (208 lb 8.9 oz).      Intake/Output Summary (Last 24 hours) at 11/03/17 1223  Last data filed at 11/03/17 0508   Gross per 24 hour   Intake           1426.7 ml   Output             1230 ml   Net            196.7 ml      HEENT: No icterus, no pallor  Cardiovascular: S1, S2 normal.   Respiratory: B/L CTA  Abdomen: Soft, NT, BS+  Neurology: Alert awake, and oriented. No tremors.  Extremities: Right LE amputation.  Left knee dressing in place.      Laboratory and Imaging Studies     I have reviewed  laboratory and imaging studies in the Epic. Pertinent findings are as below:    BMP  Recent Labs  Lab 11/03/17  0624 11/02/17  0909     --    POTASSIUM 4.0 4.0   CHLORIDE 105  --    MÓNICA 7.2*  --    CO2 25  --    BUN 23  --    CR 1.16 1.06   * 98     CBC  Recent Labs  Lab 11/03/17  0624   WBC 10.1   RBC 3.50*   HGB 9.9*   HCT 30.4*   MCV 87   MCH 28.3   MCHC 32.6   RDW 14.4        INRNo lab results found in last 7 days.  LFTsNo lab results found in last 7 days.   PANCNo lab results found in last 7 days.        Impression/Plan        72 year old year old male with hx of " Hypothyroidism, Hyperlipidemia,Bladder and Prostate cancer, Depression is being admitted s/p Left TKA on 11/02/2017     # S/P s/p Left TKA on 11/02/2017 :  Management primarily per Ortho team.  - Wound cares, Dressings, Surgical pain management, DVT Prophylaxis  per primary team.   - Monitor anemia, hemodynamics, Input/Output  - Encourage Incentive spirometry  - Laxatives for constipation prophylaxis    # Anemia: Most likely due to acute blood loss, hemodilution  Hg 9.9. On 11/03  - Transfuse if Hg < 7 gm/dl  - Hg in AM    # Hx of Hypothyroidism: PTA on Atorvastatin   - Continue     # Hx of Anxiety, Depression: PTA on Escitalopram  - Continue   # Hx of Hyperlipidemia: PTA no Atorvastatin  - Continue  # Hx of Asthma: PTA on Beclomethasone BID. Reports good control     # Hx of bladder/Prostate cancer: PTA on Tamsulosin, and Finasteride  - Continue     # UTI prevention: Patient reports that his Urologist wanted him to be on Cephalexin for 2 weeks from surgery as preventive measure..   - Cephalexin 500 mg daily      Addendum:   Asymptomatic hypotension: 500 ml NS bolus.           Robert Crane  Internal Medicine/Hospitalist  [facility]  [phone]                          Pt's care was discussed with bedside RN, patient and  during Care Team Rounds.               Robert Crane MD  Hospitalist ( Internal medicine)  Pager: [phone]

## 2017-11-03 NOTE — DISCHARGE SUMMARY
Orthopaedic Surgery  Discharge Summary    Palomo Zapata MRN# [id number]   YOB: 1945 Age: 72 year old     Date of Admission:  11/2/2017  Date of Discharge::  11/5/2017  Admitting Physician:  Karlos Hill MD  Discharge Physician:  Rosanne Berman MD  Primary Care Physician:        [facility], [place]          Admission Diagnoses:   S/P total knee arthroplasty, left [Z96.652]          Discharge Diagnosis:   Left knee osteoarthritis         Procedures:   11/2/17 Left total knee arthroplasty, Dr. Hill         Consultations:   OCCUPATIONAL THERAPY ADULT IP CONSULT  PHYSICAL THERAPY ADULT IP CONSULT  INTERNAL MEDICINE ADULT IP CONSULT FOR [facility]         Medications Prior to Admission:     Prescriptions Prior to Admission   Medication Sig Dispense Refill Last Dose     latanoprost (XALATAN) 0.005 % ophthalmic solution Place 1 drop Into the left eye At Bedtime        Finasteride (PROSCAR PO) Take 5 mg by mouth daily   11/1/2017 at 1200     tamsulosin (FLOMAX) 0.4 MG capsule Take 0.4 mg by mouth daily    11/1/2017 at 0800     Escitalopram Oxalate (LEXAPRO PO) Take 20 mg by mouth daily    11/2/2017 at 0630     beclomethasone (QVAR) 40 MCG/ACT Inhaler Inhale 1 puff into the lungs 2 times daily   11/2/2017 at 0630     ATORVASTATIN CALCIUM PO Take 10 mg by mouth   11/1/2017 at 0800     LEVOTHYROXINE SODIUM PO Take 125 mcg by mouth daily    11/1/2017 at 1200            Discharge Medications:      Palomo Zapata   Home Medication Instructions BEN:[id number]    Printed on:11/03/17 1008   Medication Information                      acetaminophen (TYLENOL) 325 MG tablet  Take 2 tablets (650 mg) by mouth every 4 hours as needed for other (surgical pain)             aspirin  MG EC tablet  Take 1 tablet (325 mg) by mouth daily for 28 days             ATORVASTATIN CALCIUM PO  Take 10 mg by mouth             beclomethasone (QVAR) 40 MCG/ACT Inhaler  Inhale 1 puff into the lungs 2 times daily              Escitalopram Oxalate (LEXAPRO PO)  Take 20 mg by mouth daily              Finasteride (PROSCAR PO)  Take 5 mg by mouth daily             latanoprost (XALATAN) 0.005 % ophthalmic solution  Place 1 drop Into the left eye At Bedtime             LEVOTHYROXINE SODIUM PO  Take 125 mcg by mouth daily              oxyCODONE IR (ROXICODONE) 5 MG tablet  Take 1-2 tablets (5-10 mg) by mouth every 4 hours as needed for moderate to severe pain             senna-docusate (SENOKOT-S;PERICOLACE) 8.6-50 MG per tablet  Take 1-2 tablets by mouth 2 times daily             tamsulosin (FLOMAX) 0.4 MG capsule  Take 0.4 mg by mouth daily                           Brief History of Illness:   73 yo male with severe pain associated with end stage left knee osteoarthritis no longer improved with non operative management.           Hospital Course:   Pt was admitted following the above procedure. Pt completed 24 hrs of perioperative antibiotics, worked well with physical therapy, and had their pain well controlled on oral medications. On the day of discharge, the incision was c/d/i and distal neurovascular exam was intact.     Discharge Procedure Orders  Reason for your hospital stay   Order Comments: You were admitted to the hospital following your total knee arthroplasty.     Adult [facility] Follow-up and recommended labs and tests   Order Comments: You are to return to clinic in 2 weeks for wound check with the clinic nurse. Approximately 6 weeks after your surgery, you will be scheduled for an appointment with your doctor. At this time, AP and lateral knee imaging will be obtained.    If you need to schedule your 2 and 6 week postoperative visits or haven't received confirmation regarding these visits, please call one of the following numbers within 7 days of discharge.    -[facility]: [phone]  -[facility]: [phone]     Activity   Order Comments: Your activity will be  as tolerated. You should focus on knee range of motion exercises, with an emphasis on obtaining full hyperextension to 90 degrees knee flexion   Order Specific Question Answer Comments   Is discharge order? Yes      When to contact your care team   Order Comments: CALL YOUR PHYSICIAN IF:  -Pain in your hip persists or worsens in the first few days after surgery.  -Excessive redness or drainage of cloudy or bloody material from the wounds (Clear red tinted fluid and some mild drainage should be expected). Drainage of any kind 5 days after surgery should be reported to the doctor.  -You have a temperature elevation greater than 101.5    -You have pain, swelling or redness in your calf.  -You have numbness or weakness in your leg or foot.      You may contact your physician at [phone] during business hours.    For after hours or weekend calls, you may contact the hospital at [phone] and ask for the on-call orthopedic resident     Wound care and dressings   Order Comments: A clean dressing/bandage was placed on your surgical wound shortly before you were discharged from the hospital. This dressing is to stay in place for at least 7 days following your surgery. If the dressing becomes saturated with wound drainage, it is appropriate to replace the dressing/bandage with sterile 4x4 gauze with tape over top to secure the gauze about the wound. If drainage persists from the incision site to the extent that it is frequently saturating the dressing, please contact your clinic nurse.     Discharge Instructions   Order Comments: TOTAL KNEE ARTHROPLASTY POSTOPERATIVE INSTRUCTIONS    A.  COMFORT:  -Elevation: Elevate your knee and ankle above the level of your heart. The best position is lying down with two pillows lengthwise under your entire leg. Do not place pillows under your knee. This should be done for the first several days after surgery.  -Swelling: An ice pack will be provided to control swelling and  discomfort. Place a thin towel between your skin and the ice pack and apply for 20 minutes.   -Pain Medication: Take medication as prescribed, but only as often as necessary.  Avoid alcohol and driving if you are taking pain medication.  Remember that Tylenol can be used for pain relief as well, as you wean off the narcotics.  Maximum Tylenol dose for a single day is 4000 mg.            B.  ACTIVITIES:  -Exercises: Point and flex your feet and wiggle your toes, move your ankle around in a Eyak, to help prevent complications such as blood clotting in your legs. Quad muscle isometric and short arc exercises should begin the day of surgery and should be done for 10 to 15 minutes, 3 times a day, for the first few weeks after surgery. Patient to focus on full hyperextension to 90 degrees knee flexion    -CPM: 0 degrees - flexion tolerance with goal of 90 degrees flexion  -Weight bearing status: You may put weight on your operative leg (weight bearing as tolerated) but may be limited due to pain. Walk using assistive devices as needed.   -Physical therapy: A prescription for physical therapy will be administered. You will also be provided with a physical therapy protocol. Both the prescription and protocol should be taken with you to your first appointment  -Driving: Driving is NOT permitted until allowed by your provider.  -Athletic activities: Athletic activities, such as swimming, bicycling, jogging, running and stop-and-go-sports should be avoided until allowed by your doctor.  -Return to work: May returned to work when allowed by your provider.     C. INCISION CARE:    -Keep the initial post-op dressing on for 7 days, as discussed above. You may shower 72 hours after surgery, however the dressing on your knee should remain in place during showering. It is acceptable for water to run over the dressing, but you are not to soak or submerge your wound underwater.     Diet   Order Comments: You may return to your  regular, pre-surgery diet unless instructed otherwise by your provider.   Order Specific Question Answer Comments   Is discharge order? Yes      Assign Questionnaire Series to Patient              Discharge Disposition:   Discharged to nursing home      Condition at discharge: Stable    Rosanne Berman MD                   :

## 2017-11-03 NOTE — PROGRESS NOTES
11/03/17 0836   Quick Adds   Type of Visit Initial PT Evaluation       Present no   Language English   Living Environment   Lives With spouse   Living Arrangements house   Home Accessibility stairs to enter home   Number of Stairs to Enter Home 3   Number of Stairs Within Home 20   Stair Railings at Home inside, present on right side;outside, present at both sides   Transportation Available car;family or friend will provide   Self-Care   Dominant Hand right   Usual Activity Tolerance moderate   Current Activity Tolerance fair   Regular Exercise other (see comments)  (Leg lifts for strengthening 1x a day)   Equipment Currently Used at Home tub bench;walker, rolling;other (see comments)  (prosthetic right LE)   Activity/Exercise/Self-Care Comment Pt reported being independent with all ADLs at baseline.    Functional Level Prior   Ambulation 1-->assistive equipment   Transferring 1-->assistive equipment   Toileting 1-->assistive equipment   Bathing 1-->assistive equipment   Dressing 0-->independent   Eating 0-->independent   Communication 0-->understands/communicates without difficulty   Swallowing 0-->swallows foods/liquids without difficulty   Cognition 0 - no cognition issues reported   Fall history within last six months yes   Number of times patient has fallen within last six months 1   Which of the above functional risks had a recent onset or change? ambulation;transferring   Prior Functional Level Comment Pt reported using FWW prior to surgery.  Pt has been using a new prosthetic for the past 2 months.    General Information   Onset of Illness/Injury or Date of Surgery - Date 11/02/17   Referring Physician Karlos Hill    Patient/Family Goals Statement Pt would like to be able wal.    Pertinent History of Current Problem (include personal factors and/or comorbidities that impact the POC) Pt is s/p left TKA.  PMH includes: asthma, thyroid disease, depressive disorder, cancer, anxiety, and s/p  AKA.     Precautions/Limitations fall precautions  (no pillow under left knee)   Weight-Bearing Status - LUE full weight-bearing   Weight-Bearing Status - RUE full weight-bearing   Weight-Bearing Status - LLE weight-bearing as tolerated   Weight-Bearing Status - RLE full weight-bearing   General Observations Pt supine in bed at start of PT session with CPM in place.    General Info Comments Pt currently getting IV fluids, hemovac, and on capnography.    Cognitive Status Examination   Orientation other (see comments)  (Not tested)   Level of Consciousness alert   Follows Commands and Answers Questions 100% of the time;able to follow multistep instructions   Personal Safety and Judgment intact   Memory intact   Pain Assessment   Patient Currently in Pain Yes, see Vital Sign flowsheet   Integumentary/Edema   Integumentary/Edema other (describe)   Integumentary/Edema Comments Incision not observed,  ace wrap in place.    Posture    Posture Forward head position;Protracted shoulders   Range of Motion (ROM)   ROM Comment Left knee ROM 0-15-74. pt demonstrated functional right hip ROM.    Strength   Strength Comments LE strength not formally tested.  Pt is able to lift left LE in/out of bed and complete a SLR.    Bed Mobility   Bed Mobility Comments Supine to/from sitting with HOB elevated and CGA x 1.    Transfer Skills   Transfer Comments Sit to/from standing from EOB with FWW and min to mod A x 2.    Gait   Gait Comments Not initiated.    Balance   Balance Comments Pt demonstrated good sitting balance at EOB.  Pt demonstrated fair standing balance with FWW.   Sensory Examination   Sensory Perception Comments Pt has no c/o numbness/tingling.    General Therapy Interventions   Planned Therapy Interventions bed mobility training;gait training;transfer training;strengthening;home program guidelines   Intervention Comments Bed moblity, transfers, and LE strengthening/ROM initiated.    Clinical Impression   Criteria for  "Skilled Therapeutic Intervention yes, treatment indicated   PT Diagnosis Decreased strength, decreased ROM, and impaired functional mobility.    Influenced by the following impairments Post-op pain, weakness, and decreased ROM.    Functional limitations due to impairments Impaired bed mobility, transfers, and gait.    Clinical Presentation Stable/Uncomplicated   Clinical Presentation Rationale Pt is a 71 y/o male s/p left TKA.  PMH includes: asthma, thyroid disease, depressive disorder, cancer, anxiety, and s/p AKA.  Personal factors include: stairs to get into the home and within the home, but has full time assist at home.    Clinical Decision Making (Complexity) Low complexity   Therapy Frequency` 2 times/day   Predicted Duration of Therapy Intervention (days/wks) 4 days   Anticipated Equipment Needs at Discharge other (see comments)  (No equipment needs)   Anticipated Discharge Disposition Transitional Care Facility   Risk & Benefits of therapy have been explained Yes   Patient, Family & other staff in agreement with plan of care Yes   [facility] [employer] TM \"6 Clicks\"   2016, Trustees of [facility], under license to [employer].  All rights reserved.   6 Clicks Short Forms Basic Mobility Inpatient Short Form   [facility] AM-PAC  \"6 Clicks\" V.2 Basic Mobility Inpatient Short Form   1. Turning from your back to your side while in a flat bed without using bedrails? 4 - None   2. Moving from lying on your back to sitting on the side of a flat bed without using bedrails? 3 - A Little   3. Moving to and from a bed to a chair (including a wheelchair)? 2 - A Lot   4. Standing up from a chair using your arms (e.g., wheelchair, or bedside chair)? 2 - A Lot   5. To walk in hospital room? 2 - A Lot   6. Climbing 3-5 steps with a railing? 1 - Total   Basic Mobility Raw Score (Score out of 24.Lower scores equate to lower levels of function) 14   Total Evaluation Time   Total Evaluation Time (Minutes) 10 "

## 2017-11-03 NOTE — PROGRESS NOTES
"Social Work: Assessment with Discharge Plan    Patient Name:  Palomo Zapata  :  1945  Age:  72 year old  MRN:  [id number]  Risk/Complexity Score:  0   Completed assessment with:  Patient and pt's wife Ema     Presenting Information   Reason for Referral:  Discharge plan  Date of Intake:  November 3, 2017  Referral Source:  Chart Review  Decision Maker:  Patient   Alternate Decision Maker:  Patients wife   Health Care Directive:  Copy in Chart  Living Situation:  House-Patient lives with his wife in a town home. Patient spends the majority of his time in the lower level, which has 15  steps to get up and down. Pt's wife reported that she brings patients meals down to him and that patient has not left the house much in the last year d/t only being able to use one leg. Patient's wife reported that they have two walkers, a transport wheelchair and shower/bathroom DME equipment at home. Patient has not driven in the past year so pt's wife transports pt's to appointments as needed. Pt's wife is retired and able to provide support to patient as needed.    Previous Functional Status:  Patients wife provided patient assistance with some ADL's and IDL's   Patient and family understanding of hospitalization:  Appropriate for restorative care.   Cultural/Language/Spiritual Considerations:  None reported. Pt is english speaking.   Adjustment to Illness:  Patient reported that he has been feeling \"trapped\" the past months, d/t not being able to ambulate. Patient reported that he has dealt with depression but d/t ongoing medical issues, pt feels his depression has increased. Pt did express that he is hopeful that after this admissions he will have a successful recovery.     Physical Health  Reason for Admission:    1. Status post knee surgery      Services Needed/Recommended:  TCU    Mental Health/Chemical Dependency  Diagnosis:  Patient reported that he has a hx. Of severe depression and has a hx. Of CD. 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 Patient also expressed interest in talking with a health psychologist while inpatient. SW let NP know, who put in consult.     Support System  Significant relationship at present time:  Patients wife, Ema  Family of origin is available for support:  Patients daughter Madison who lives in [place]   Other support available:  Yes, Friends and sister in law   Gaps in support system:  None reported   Patient is caregiver to:  None     Provider Information   Primary Care Physician:  [facility], [place]   [phone]   Clinic:  [id number] [facility] [address]      :  None reported     Financial   Income Source:  SSI, MCFP   Financial Concerns:  None reported   Insurance:    Payor/Plan Subscriber Name Rel Member # Group #   MEDICARE - MEDICARE F* JANICE MCCOY  [id number]       ATTN CLAIMS, [address]   BCBS - BCBS PLATINUM * JANICE MCCOY  [id number]      [address] [address]       Discharge Plan   Patient and family discharge goal:  TCU  Provided education on discharge plan:  YES  Patient agreeable to discharge plan:  YES  A list of Medicare Certified Facilities was provided to the patient and/or family to encourage patient choice. Patient's choices for facility are:  Yes, Patient and pt's wife requested referrals be sent to [facility]. [facility] 3. [place]   Will NH provide Skilled rehabilitation or complex medical:  YES  General information regarding anticipated insurance coverage and possible out of pocket cost was discussed. Patient and patient's family are aware patient may incur the cost of transportation to the facility, pending insurance payment: YES  Barriers to discharge:  Medical Stability     Discharge " Recommendations   Anticipated Disposition:  Facility:  [facility] TCU  Transportation Needs:  Family:  Patients wife will provide ride      Additional comments   SW sent referrals to requested facilities listed in order of patients preference. Patient was accepted to both [facility] and [facility]. Patient has requested to discharge to [facility] TCU once medically stable. MARIXA did speak with admissions at [facility] who is anticipating DC on Sunday.   [id number]  Disposition:   1.[facility]- Weekend RN supervisor: [phone] F: [phone]    Referrals Discontinued:  2.[facility]-Patient was accepted at facility but has chosen different TCU. Updated admissions at [facility].   3. [facility]     BRANDY Salazar  [address]   Phone: [phone]  Pager: [phone]

## 2017-11-03 NOTE — PLAN OF CARE
Problem: Patient Care Overview  Goal: Plan of Care/Patient Progress Review  Discharge Planner OT   Patient plan for discharge: TCU  Current status: Patient able to complete bed mobility with CGA.  Sat EOB with SBA 10 minutes for g/h tasks and oral cares.  Declined transfer to commode due to fatigue/pain.    Barriers to return to prior living situation: Age, limited strength, R AKA 6 years ago and now a L TKA with pain and decreased ROM  Recommendations for discharge: TCU  Rationale for recommendations: Continued daily OT for maximum independence in ADLs/mobility       Entered by: Erika Kay 11/03/2017 10:34 AM

## 2017-11-03 NOTE — PROVIDER NOTIFICATION
notified in person that pt is asymptomatic and has a blood pressure reading of 88/49. Instructed by  to give a 500ml NS Bolus. 500ml NS bolus given with new BP 94/50,  updated and writer received verbal order that it is OK to give Oxycodone.

## 2017-11-03 NOTE — PLAN OF CARE
Problem: Patient Care Overview  Goal: Plan of Care/Patient Progress Review  Pt. A&Ox4. Soft bp, 92/40 overnight, moonlighter paged, 500mL NS bolus given, 94/52 this am. Afebrile. Lung sounds CTA. Maintaining sats, was on 1LPM O2, weaned to RA overnight. IS encouraged, achieving around 2250mL. Bowel sounds active, LBM 11/1, flatus +. PP+ DP+. CMS and neuro's are intact. Reporting numbness in LLE consistent w/ block. Denies nausea, shortness of breath, and chest pain. L knee pain managed w/ scheduled tylenol, prn oxycodone, and ice applied. Pastrana patent w/ adequate output. Tolerating regular diet. LLE incisional dressing is CDI. CPM in use, currently 0-45, in use all shift. Hemovac intact, output 80mL. Pt sat at EOB last evening. PIV is patent and infusing NS. Call light is within reach, pt able to make needs known. Will continue to monitor.

## 2017-11-03 NOTE — PLAN OF CARE
Problem: PT General Care Plan  Goal: PT target date for goal attainment  PT: patient displayed improvements with AAROM on Left LE and transfers. Patient continues to have difficulty with fitting into right prosthetic secondary to swelling.      Discharge Planner PT   Patient plan for discharge: TCU  Current status:patient required moderate assist for sit to stand and minimal assist to SBA for bed mobility. Patient able to obtain AAROM 0-8-85 degrees with good tolerance.   Barriers to return to prior living situation: requires 24 hour assist secondary to weakness  Recommendations for discharge: TCU  Rationale for recommendations: improve strength, progress gait training and independence with transfers from safe return home.        Entered by: Eve Urena 11/03/2017 4:48 PM

## 2017-11-03 NOTE — PLAN OF CARE
Problem: Patient Care Overview  Goal: Plan of Care/Patient Progress Review  Discharge Planner PT   Patient plan for discharge: TCU  Current status: Pt was able to complete supine to/from sitting with HOB elevated and CGA x 1.  Sit to/from standing with FWW and min A x 2.  Gait was not initiated this AM secondary to pt unable to get prosthetic on due to edema, pt is going to have spouse bring in .  Pt tolerated TKA exercises well but did have increased pain, knee ROM 0-15-74 degrees.   Barriers to return to prior living situation: Pain, decreased strength, decreased ROM, and stairs.   Recommendations for discharge: TCU  Rationale for recommendations: Pt would benefit from further skilled PT for LE strengthening/ROM and increase independence with all functional mobility/gait.        Entered by: Carmela Romeo 11/03/2017 10:25 AM

## 2017-11-03 NOTE — PLAN OF CARE
Problem: Patient Care Overview  Goal: Individualization & Mutuality  Pt A/O X 4. Afebrile. Systolic Blood pressure in the 80's,  notified in person, pt given a 500ml NS Bolus, BP recheck 94/50 and  updated. Lungs- Clear bilaterally with both anterior and posterior. IS encouraged. Bowels- Hyperactive in all four quadrants. CMS and Neuro's are intact. Denies numbness and tingling in all extremities. Denies nausea, shortness of breath, and chest pain. Has pain in the left knee and given 5mg Oxycodone, ICE applied, and is tolerating well. Pastrana Catheter is patent with 900ml's clear yellow output, verbal order by  to keep Pastrana Catheter in today. Is on a Regular diet and appetite was Good this shift. Left knee incisional dressing is C/D/I and ACE-wrapped. Pt has right Above the knee amputee. Hemovac bag changed this shift and has 80ml's output. CPM is ON and set to 40 degrees flexion and 0 degrees extension. Pt up to the side of bed with Assist X 1-2, gait belt, right prosthetic leg in place, and crutches. PIV is patent in the right arm and infusing. Left heel elevated off the bed. Pt is able to make needs known and the call light is within the pt's reach. Continue to monitor.

## 2017-11-03 NOTE — PROGRESS NOTES
"Acupuncture Clinical Internship Intake and Treatment Documentation   [facility]    Date:  11/3/2017  Patient Name:  Palomo Zapata   YOB: 1945     Repeat Patient:  no  Has patient had acupoint/acupressure treatment before:  yes    Signed consent placed in the medical record:  yes  Patient/Family verbalizes understanding of risks and benefits:  yes  Required information provided to patient:  yes    Diagnosis:  S/P total knee arthroplasty, left [Z96.652]    Patient condition and treatment:  Pain in left knee  Reason for Intervention Today/Chief Complaint:  Pain in left knee    Isolation:  No  Type:  None    PRE-SCORE:  moderate    Other Western medical information:  N/A    Medications    Current Facility-Administered Medications:      cephALEXin (KEFLEX) capsule 500 mg, 500 mg, Oral, Daily, Robert Crane MD, 500 mg at 11/03/17 0925     NaCl 0.9 % 8.95 mL with ropivacaine (NAROPIN) 200 mg, EPINEPHrine (ADRENALIN) 300 mcg, ketorolac (TORADOL) 15 mg, morphine 2.5 mg, , INTRA-ARTICULAR, Once, Karlos Hill MD     lidocaine (LMX4) kit, , Topical, Q1H PRN, Spike Watkins MD     sodium chloride (PF) 0.9% PF flush 3 mL, 3 mL, Intracatheter, Q1H PRN, Spike Watkins MD     sodium chloride (PF) 0.9% PF flush 3 mL, 3 mL, Intracatheter, Q8H, Spike Watkins MD     lactated ringers infusion, , Intravenous, Continuous, Spike Watkins MD, Last Rate: 0 mL/hr at 11/02/17 1039     bupivacaine liposome (EXPAREL) LONG ACTING injection was administered into the infiltration site to produce postsurgical analgesia. Duration of action is up to 72 hours, and other \"sabra\" medications should not be given for 96 hours with the exception of the lidocaine 5% patch (LIDODERM) and the lidocaine 10mg in potassium infusions. This entry is for INFORMATION ONLY., , Does not apply, Continuous PRN, Zaire Waddell MD     atorvastatin (LIPITOR) tablet 10 mg, 10 mg, " Oral, Daily at 8 pm, Karlos Hill MD, 10 mg at 11/02/17 1952     fluticasone furoate (ARNUITY ELLIPTA) 100 MCG/ACT inhalation powder 1 puff, 1 puff, Inhalation, Daily, Karlos Hill MD, 1 puff at 11/03/17 0753     finasteride (PROSCAR) tablet 5 mg, 5 mg, Oral, Daily, Karlos Hill MD, 5 mg at 11/03/17 0807     tamsulosin (FLOMAX) capsule 0.4 mg, 0.4 mg, Oral, Daily, Karlos Hill MD     lidocaine 1 % 1 mL, 1 mL, Other, Q1H PRN, Karlos Hill MD     lidocaine (LMX4) kit, , Topical, Q1H PRN, Karlos Hill MD     sodium chloride (PF) 0.9% PF flush 3 mL, 3 mL, Intracatheter, Q1H PRN, Karlos Hill MD     sodium chloride (PF) 0.9% PF flush 3 mL, 3 mL, Intracatheter, Q8H, Karlos Hill MD     0.9% sodium chloride infusion, , Intravenous, Continuous, Dhital, MD Abilio, Last Rate: 125 mL/hr at 11/03/17 0808     benzocaine-menthol (CEPACOL) 15-3.6 MG lozenge 1-2 lozenge, 1-2 lozenge, Buccal, Q1H PRN, Karlos Hill MD     naloxone (NARCAN) injection 0.1-0.4 mg, 0.1-0.4 mg, Intravenous, Q2 Min PRN, Karlos Hill MD     acetaminophen (TYLENOL) tablet 975 mg, 975 mg, Oral, Q8H, Karlos Hill MD, 975 mg at 11/03/17 0925     [START ON 11/5/2017] acetaminophen (TYLENOL) tablet 650 mg, 650 mg, Oral, Q4H PRN, Karlos Hill MD     oxyCODONE IR (ROXICODONE) tablet 5-10 mg, 5-10 mg, Oral, Q4H PRN, Karlos Hill MD, 5 mg at 11/03/17 1326     diphenhydrAMINE (BENADRYL) solution 12.5 mg, 12.5 mg, Oral, Q6H PRN **OR** diphenhydrAMINE (BENADRYL) injection 12.5 mg, 12.5 mg, Intravenous, Q6H PRN, Karlos Hill MD     ondansetron (ZOFRAN-ODT) ODT tab 4 mg, 4 mg, Oral, Q6H PRN **OR** ondansetron (ZOFRAN) injection 4 mg, 4 mg, Intravenous, Q6H PRN, Karlos Hill MD     prochlorperazine (COMPAZINE) injection 5 mg, 5 mg, Intravenous, Q6H PRN **OR** prochlorperazine (COMPAZINE) tablet 5 mg, 5 mg, Oral, Q6H PRN, Karlos Hill MD     metoclopramide (REGLAN) tablet 5 mg,  5 mg, Oral, Q6H PRN **OR** metoclopramide (REGLAN) injection 5 mg, 5 mg, Intravenous, Q6H PRN, Karlos Hill MD     senna-docusate (SENOKOT-S;PERICOLACE) 8.6-50 MG per tablet 1-2 tablet, 1-2 tablet, Oral, BID, Karlos Hill MD, 2 tablet at 11/03/17 0750     melatonin tablet 1 mg, 1 mg, Oral, At Bedtime PRN, Karlos Hill MD     escitalopram (LEXAPRO) tablet 20 mg, 20 mg, Oral, Daily, Robert Crane MD, 20 mg at 11/03/17 0750     latanoprost (XALATAN) 0.005 % ophthalmic solution 1 drop, 1 drop, Left Eye, At Bedtime, Robert Crane MD, 1 drop at 11/02/17 2327     levothyroxine (SYNTHROID/LEVOTHROID) half-tab 125 mcg, 125 mcg, Oral, Daily, Robert Crane MD, 125 mcg at 11/03/17 0750     aspirin EC EC tablet 325 mg, 325 mg, Oral, Daily, Karlos Hill MD, 325 mg at 11/03/17 0750  Current Outpatient Prescriptions   Medication Sig Dispense Refill     oxyCODONE IR (ROXICODONE) 5 MG tablet Take 1-2 tablets (5-10 mg) by mouth every 4 hours as needed for moderate to severe pain 80 tablet 0     [START ON 11/5/2017] acetaminophen (TYLENOL) 325 MG tablet Take 2 tablets (650 mg) by mouth every 4 hours as needed for other (surgical pain) 100 tablet 1     aspirin  MG EC tablet Take 1 tablet (325 mg) by mouth daily for 28 days 28 tablet 0     senna-docusate (SENOKOT-S;PERICOLACE) 8.6-50 MG per tablet Take 1-2 tablets by mouth 2 times daily 40 tablet 1       Pre-Treatment Assessment  Chief Complaint/ Reason for Intervention Today:  Left Knee Pain   Chief Complaint Pre-Score:  moderate   Describe:  Dull, Achy Pain in left leg. Does not radiate.   Pain Location:  Left Knee   Pre Session Pain:  Moderate  Pre Session Anxiety:  Mild  Pre Session Nausea:  None    10 Traditional Chinese Medicine Assessment Questions  - Cold/ Heat:  Neutral   - Sweat:  None   - Headaches/Body aches:  Shoulder Tension, No headache  - Chest/Abdomen:  No chest pains or abdominal issues  - Digestion:  No issues   - Bowel  Movement/Urination:  Once a day formed, easy to pass, no odor, pain, or blood. Urination is frequently due to history of bladder cancer.  - Hearing/Vision:  No hearing problems but some floaters  - Sleep (prior to hospital):  6 hours, does not wake up rested, trouble falling asleep, frequent urination, Some dreams.   - Energy:  Low energy.   - Emotions:  Some anxiety and depression. Raising thoughts but no harmful thoughts.   - Ob Gyn:  No history of prostate cancer  - Miscellaneous:  N/A    Traditional Chinese Medicine Assessment  - TONGUE:  Pale, Puffy, Fissures down the center, No coat  - PULSE:  Slippery   - OBSERVATIONS:  A little anxious.      Traditional Chinese Medicine Diagnosis  - BRANCH:  Blood Deficiency  - ROOT:  Kidney Yin and Yang Deficiency      Traditional Chinese Medicine Treatment  - ACUPUNCTURE:  Right Ear: Cuevas Men, Ki, Juliette, Knee  Left Ear: Cuevas Men, [place].   PC 6 on the left     Magnet informed consent signed and given:  no    Post Treatment Assessment  Chief complaint post score:  better  Post Session Observation:  Relaxed  Patient/Family Education:  Continue treatment and eat more red/black foods  Verbal information provided:  yes  Written information provided:  yes  All questions answered at time of treatment:  yes    Treatment/Procedure(s) performed by:  Shawn Ramos    Date: 11/3/2017     [facility]  Supervising acupuncturist:  Benoit Rai LAc [facility] Acupuncture license #: [id number]  P: [phone]

## 2017-11-03 NOTE — PLAN OF CARE
D- Patient's BP dropped to 89/47 at 1200.  BP 99/49 at 0800  I- 500 mL 0.9 NA IV bolus over 60 minutes  A- BP 94/50 at 1327  P- Continue to monitor patient's BP for changes

## 2017-11-03 NOTE — PHARMACY
Prescriber Notification Note    The pharmacist has communicated with this patient's provider regarding a concern or therapy recommendation.    Notified Person: Ortho on call, spoke with Dr. Veto Calloway  Date/Time of Notification: 11/2/17 @4658  Interaction: phone  Concern/Recommendation:   -Patient is s/p left TKA today. Did not see DVT prophylaxis ordered yet. Do not see a note from the ortho team about what the plan is for DVT prophylaxis, so I wanted to follow up on this.     Comments/Additional Details: Dr. Calloway stated he would investigate this. I told him who had performed the surgery and he planned on following up with them about the DVT prophylaxis plans.    Addendum: Aspirin 325mg po daily ordered for DVT prophylaxis.    Deena Raza, PharmD, BCPS

## 2017-11-03 NOTE — BRIEF OP NOTE
Orthopaedic Surgery Brief Op-Note      Patient: Palomo Zapata  : 1945  Date of Service: 2017 8:40 PM    Pre-operative Diagnosis: Osteoarthritis Left Knee   Post-operative Diagnosis: same    Procedure(s) Performed: Procedure(s):  Left Total Knee Arthroplasty   - Wound Class: I-Clean    Staff: Karlos Hill MD  Assistants:   MD Leandro Danielle PA    Anesthesia: General  EBL: 50 cc  UOP: see anesthesia record  Tourniquet Time: see anesthesia record    Implants:     Implant Name Type Inv. Item Serial No.  Lot No. LRB No. Used   IMP COMP FEMORAL VANGARD BIOM LT 75MM [id number] Total Joint Component/Insert IMP COMP FEMORAL VANGARD BIOM LT 75MM [id number]  [employer] [id number] Left 1   IMP TIBIAL BEARING CR-LIPPED VANGUARD BIOM 10X87/91MM [id number] Total Joint Component/Insert IMP TIBIAL BEARING CR-LIPPED VANGUARD BIOM 10X87/91MM [id number]  [employer] [id number] Left 1   IMP COMP TIBIAL KNEE ASCENT 87MM [id number] Total Joint Component/Insert IMP COMP TIBIAL KNEE ASCENT 87MM [id number]  [employer] [id number] Left 1   Biomet  Asymmetrical Patella 34mm 8.5mm Total Joint Component/Insert     BIOMET [id number] Left 1            Drains: medium hemovac  Intra-op Labs/Cxs/Specimens: None  Complications: No apparent complications during procedure  Findings: Please see dictated operative note for details    Disposition: Stable to PACU, then admit to Orthopaedics.        Assessment/Plan:   Palomo Zapata is a 72 year old male s/p Procedure(s):  Left Total Knee Arthroplasty   - Wound Class: I-Clean on 2017 with Dr. Hill.    Activity: Up with assist.  Weight bearing status: AT   Antibiotics: Clinda x 24 hours.  Diet: Begin with clear fluids and progress diet as tolerated.  Bowel regimen. Anti-emetics PRN.    DVT prophylaxis:  mg  Dressing: To change pod 1 and q 2 day thereafter  Bracing/Splinting: none  Drains: Document output per shift, discontinue when <50cc/shift.   Pain management: transition from IV to orals  as tolerated.    Physical Therapy/Occupational Therapy  Follow-up: Clinic with Dr. Hill in 2 weeks, x-rays needed.    Future Appointments  Date Time Provider Department Center   11/3/2017 8:30 AM Carmela Romeo Pt, PT [facility] [place]   11/3/2017 9:30 AM Erika Kay, OT [facility] [place]   11/3/2017 7:00 PM UR PT OVERFLOW [facility] [place]   11/16/2017 12:30 PM Brissa Chao, APRN [facility]   12/11/2017 1:15 PM Karlos Hill MD [facility]       Disposition: Pending progress with therapies, pain control on orals, and medical stability, anticipate discharge to TCU on POD # 3.    Signed:   Nikko Germain MD  Adult Reconstructive Surgery Fellow  Dept Orthopaedic Surgery, [facility]

## 2017-11-03 NOTE — PROGRESS NOTES
11/03/17 0939   Quick Adds   Type of Visit Initial Occupational Therapy Evaluation   Living Environment   Lives With spouse   Living Arrangements house   Number of Stairs to Enter Home 3   Number of Stairs Within Home 20   Transportation Available car;family or friend will provide   Living Environment Comment Walk-in shower, shower chair, RTS with handrails   Self-Care   Usual Activity Tolerance moderate   Current Activity Tolerance fair   Regular Exercise no   Equipment Currently Used at Home walker, standard   Activity/Exercise/Self-Care Comment Patient independent in ADLs/mobility with walker, patient reports for last year has struggled due to skin breakdown on R AKA and unable to wear prosthesis.  Patient did not drive for last year due to this reason.  Patient was fit with new prosthesis one month ago following plastic surgery to stump.   Functional Level Prior   Ambulation 1-->assistive equipment   Transferring 1-->assistive equipment   Toileting 1-->assistive equipment   Bathing 1-->assistive equipment   Dressing 1-->assistive equipment   Eating 1-->assistive equipment   Communication 0-->understands/communicates without difficulty   Swallowing 0-->swallows foods/liquids without difficulty   Cognition 0 - no cognition issues reported   Fall history within last six months yes   Number of times patient has fallen within last six months 1   Prior Functional Level Comment Has reacher, LH shoe horn   General Information   Onset of Illness/Injury or Date of Surgery - Date 11/02/17   Referring Physician Dr. Hill   Patient/Family Goals Statement reports MD vergara DEN   Additional Occupational Profile Info/Pertinent History of Current Problem POD #1 s/p L TKA, R AKA 6 years ago   Precautions/Limitations no known precautions/limitations   Weight-Bearing Status - LLE weight-bearing as tolerated   General Observations On capno   Cognitive Status Examination   Cognitive Comment No cognitive concerns   Sensory Examination    Sensory Comments No N/T noted   Pain Assessment   Patient Currently in Pain Yes, see Vital Sign flowsheet   Range of Motion (ROM)   ROM Comment B UE AROM WFL   Strength   Strength Comments B UE MMT WFL   Mobility   Bed Mobility Bed mobility skill: Sit to supine;Bed mobility skill: Supine to sit   Bed Mobility Skill: Sit to Supine   Level of Dixie: Sit/Supine contact guard   Bed Mobility Skill: Supine to Sit   Level of Dixie: Supine/Sit contact guard   Transfer Skill: Bed to Chair/Chair to Bed   Level of Dixie: Bed to Chair unable to perform   Transfer Skill: Sit to Stand   Level of Dixie: Sit/Stand unable to perform   Transfer Skill: Toilet Transfer   Level of Dixie: Toilet unable to perform   Bathing   Level of Dixie unable to perform   Upper Body Dressing   Level of Dixie: Dress Upper Body independent   Lower Body Dressing   Level of Dixie: Dress Lower Body unable to perform   Toileting   Level of Dixie: Toilet dependent (less than 25% patients effort)   Grooming   Level of Dixie: Grooming independent   Eating/Self Feeding   Level of Dixie: Eating independent   Activities of Daily Living Analysis   Impairments Contributing to Impaired Activities of Daily Living pain;ROM decreased;strength decreased   General Therapy Interventions   Planned Therapy Interventions ADL retraining   Clinical Impression   Criteria for Skilled Therapeutic Interventions Met yes, treatment indicated   OT Diagnosis impaired self-cares/mobility   Influenced by the following impairments pain; decreased ROM   Assessment of Occupational Performance 1-3 Performance Deficits   Identified Performance Deficits dressing, bathing, toileting   Clinical Decision Making (Complexity) Low complexity   Therapy Frequency daily   Predicted Duration of Therapy Intervention (days/wks) 2-3 days   Anticipated Discharge Disposition Transitional Care Facility   Risks and Benefits of  "Treatment have been explained. Yes   Patient, Family & other staff in agreement with plan of care Yes   [facility] TM \"6 Clicks\"   2016, Trustees of [facility], under license to [employer].  All rights reserved.   6 Clicks Short Forms Daily Activity Inpatient Short Form   [facility]  \"6 Clicks\" Daily Activity Inpatient Short Form   1. Putting on and taking off regular lower body clothing? 2 - A Lot   2. Bathing (including washing, rinsing, drying)? 2 - A Lot   3. Toileting, which includes using toilet, bedpan or urinal? 2 - A Lot   4. Putting on and taking off regular upper body clothing? 4 - None   5. Taking care of personal grooming such as brushing teeth? 4 - None   6. Eating meals? 4 - None   Daily Activity Raw Score (Score out of 24.Lower scores equate to lower levels of function) 18   Total Evaluation Time   Total Evaluation Time (Minutes) 10     "

## 2017-11-03 NOTE — PLAN OF CARE
Problem: Patient Care Overview  Goal: Plan of Care/Patient Progress Review      VS:    Afebrile- Maintaining O2 SATS in upper 90s on 2L O2. Capnography on and audible. Soft BP- Moonlighter notified, bolus given, BP improved. Slightly tachycardic, improved with bolus. Sleepy, arouses easily.    Output:    Pastrana catheter positional and draining adequate amounts of urine. No BM- not passing gas yet per patient report. Bowel sounds hypoactive.    Activity:    In bed this evening- will attempt to dangle before bed.    Skin: Intact- UTV under incisional dressing on left knee. Small, purple scabs noted on bilateral arms- baseline per patient report.    Pain:    Denied pain this evening, resting comfortably in bed.    CMS:    Intact- denies numbness/ tingling.    Dressing:    CDI    Diet:    Regular- good appetite. Ate 100% of dinner this evening. Fluids encouraged, patient tolerating ginger ale and water.    LDA:    PIV patent and infusing NS. Pastrana catheter in place. Hemovac draining bright, bloody drainage.    Equipment:    CPM set to 35 degrees flexion, 0 degrees extension. PCD in room.          Additional Info:    Pt has a below the knee amputation w/ prosthetic leg. Pt ambulates well with prothesis per patient report.

## 2017-11-04 ENCOUNTER — APPOINTMENT (OUTPATIENT)
Dept: OCCUPATIONAL THERAPY | Facility: CLINIC | Age: 72
DRG: 470 | End: 2017-11-04
Attending: ORTHOPAEDIC SURGERY
Payer: MEDICARE

## 2017-11-04 ENCOUNTER — APPOINTMENT (OUTPATIENT)
Dept: PHYSICAL THERAPY | Facility: CLINIC | Age: 72
DRG: 470 | End: 2017-11-04
Attending: ORTHOPAEDIC SURGERY
Payer: MEDICARE

## 2017-11-04 LAB — HGB BLD-MCNC: 10.2 G/DL (ref 13.3–17.7)

## 2017-11-04 PROCEDURE — 99231 SBSQ HOSP IP/OBS SF/LOW 25: CPT | Performed by: INTERNAL MEDICINE

## 2017-11-04 PROCEDURE — A9270 NON-COVERED ITEM OR SERVICE: HCPCS | Mod: GY | Performed by: INTERNAL MEDICINE

## 2017-11-04 PROCEDURE — 36415 COLL VENOUS BLD VENIPUNCTURE: CPT | Performed by: INTERNAL MEDICINE

## 2017-11-04 PROCEDURE — 97530 THERAPEUTIC ACTIVITIES: CPT | Mod: GO | Performed by: OCCUPATIONAL THERAPIST

## 2017-11-04 PROCEDURE — 97530 THERAPEUTIC ACTIVITIES: CPT | Mod: GP

## 2017-11-04 PROCEDURE — 25000132 ZZH RX MED GY IP 250 OP 250 PS 637: Mod: GY | Performed by: INTERNAL MEDICINE

## 2017-11-04 PROCEDURE — A9270 NON-COVERED ITEM OR SERVICE: HCPCS | Mod: GY | Performed by: ORTHOPAEDIC SURGERY

## 2017-11-04 PROCEDURE — 12000001 ZZH R&B MED SURG/OB UMMC

## 2017-11-04 PROCEDURE — 85018 HEMOGLOBIN: CPT | Performed by: INTERNAL MEDICINE

## 2017-11-04 PROCEDURE — 97110 THERAPEUTIC EXERCISES: CPT | Mod: GP

## 2017-11-04 PROCEDURE — 97535 SELF CARE MNGMENT TRAINING: CPT | Mod: GO | Performed by: OCCUPATIONAL THERAPIST

## 2017-11-04 PROCEDURE — 25000128 H RX IP 250 OP 636: Performed by: INTERNAL MEDICINE

## 2017-11-04 PROCEDURE — 99207 ZZC CDG-MDM COMPONENT: MEETS LOW - DOWN CODED: CPT | Performed by: INTERNAL MEDICINE

## 2017-11-04 PROCEDURE — 97116 GAIT TRAINING THERAPY: CPT | Mod: GP

## 2017-11-04 PROCEDURE — 25000132 ZZH RX MED GY IP 250 OP 250 PS 637: Mod: GY | Performed by: ORTHOPAEDIC SURGERY

## 2017-11-04 PROCEDURE — 40000193 ZZH STATISTIC PT WARD VISIT

## 2017-11-04 PROCEDURE — 40000133 ZZH STATISTIC OT WARD VISIT: Performed by: OCCUPATIONAL THERAPIST

## 2017-11-04 RX ORDER — BISACODYL 10 MG
10 SUPPOSITORY, RECTAL RECTAL DAILY PRN
Status: DISCONTINUED | OUTPATIENT
Start: 2017-11-04 | End: 2017-11-05 | Stop reason: HOSPADM

## 2017-11-04 RX ADMIN — Medication 1 MG: at 22:41

## 2017-11-04 RX ADMIN — SENNOSIDES AND DOCUSATE SODIUM 2 TABLET: 8.6; 5 TABLET ORAL at 19:46

## 2017-11-04 RX ADMIN — ATORVASTATIN CALCIUM 10 MG: 10 TABLET, FILM COATED ORAL at 19:46

## 2017-11-04 RX ADMIN — OXYCODONE HYDROCHLORIDE 10 MG: 5 TABLET ORAL at 19:46

## 2017-11-04 RX ADMIN — ASPIRIN 325 MG: 325 TABLET, DELAYED RELEASE ORAL at 08:47

## 2017-11-04 RX ADMIN — OXYCODONE HYDROCHLORIDE 10 MG: 5 TABLET ORAL at 06:28

## 2017-11-04 RX ADMIN — ACETAMINOPHEN 975 MG: 325 TABLET, FILM COATED ORAL at 23:35

## 2017-11-04 RX ADMIN — OXYCODONE HYDROCHLORIDE 10 MG: 5 TABLET ORAL at 10:41

## 2017-11-04 RX ADMIN — LATANOPROST 1 DROP: 50 SOLUTION OPHTHALMIC at 21:36

## 2017-11-04 RX ADMIN — SENNOSIDES AND DOCUSATE SODIUM 2 TABLET: 8.6; 5 TABLET ORAL at 08:48

## 2017-11-04 RX ADMIN — CEPHALEXIN 500 MG: 500 CAPSULE ORAL at 08:47

## 2017-11-04 RX ADMIN — SODIUM CHLORIDE: 9 INJECTION, SOLUTION INTRAVENOUS at 01:40

## 2017-11-04 RX ADMIN — ACETAMINOPHEN 975 MG: 325 TABLET, FILM COATED ORAL at 17:17

## 2017-11-04 RX ADMIN — FINASTERIDE 5 MG: 5 TABLET, FILM COATED ORAL at 08:48

## 2017-11-04 RX ADMIN — FLUTICASONE FUROATE 1 PUFF: 100 POWDER RESPIRATORY (INHALATION) at 08:49

## 2017-11-04 RX ADMIN — TAMSULOSIN HYDROCHLORIDE 0.4 MG: 0.4 CAPSULE ORAL at 08:48

## 2017-11-04 RX ADMIN — BISACODYL 10 MG: 10 SUPPOSITORY RECTAL at 14:36

## 2017-11-04 RX ADMIN — ACETAMINOPHEN 975 MG: 325 TABLET, FILM COATED ORAL at 08:49

## 2017-11-04 RX ADMIN — ESCITALOPRAM OXALATE 20 MG: 10 TABLET ORAL at 08:48

## 2017-11-04 RX ADMIN — Medication 125 MCG: at 08:48

## 2017-11-04 RX ADMIN — OXYCODONE HYDROCHLORIDE 10 MG: 5 TABLET ORAL at 01:38

## 2017-11-04 RX ADMIN — OXYCODONE HYDROCHLORIDE 10 MG: 5 TABLET ORAL at 23:32

## 2017-11-04 RX ADMIN — OXYCODONE HYDROCHLORIDE 10 MG: 5 TABLET ORAL at 14:41

## 2017-11-04 RX ADMIN — ACETAMINOPHEN 975 MG: 325 TABLET, FILM COATED ORAL at 01:38

## 2017-11-04 NOTE — PROGRESS NOTES
"[facility], [place]   Internal Medicine Daily Note           Interval History/Events     Overnight events reviewed  Reports doing well  No ligtheadedess or dizziness  Pain controlled.   No fever, chills nausea, vomiting  Passing gas and no BM yet       Review of Systems        4 point ROS including Respiratory, CV, GI and , other than that noted above is negative      Medications   I have reviewed current medications  in the \"current medication\" section of Epic.  Relevant changes include:     Physical Exam   General:       Vital signs:    Blood pressure 112/59, pulse 87, temperature 97.5  F (36.4  C), temperature source Oral, resp. rate 16, height 1.829 m (6'), weight 94.6 kg (208 lb 8.9 oz), SpO2 94 %.  Estimated body mass index is 28.29 kg/(m^2) as calculated from the following:    Height as of this encounter: 1.829 m (6').    Weight as of this encounter: 94.6 kg (208 lb 8.9 oz).      Intake/Output Summary (Last 24 hours) at 11/03/17 1223  Last data filed at 11/03/17 0508   Gross per 24 hour   Intake           1426.7 ml   Output             1230 ml   Net            196.7 ml      HEENT: No icterus, no pallor  Cardiovascular: S1, S2 normal.   Respiratory: B/L CTA  Abdomen: Soft, NT, BS+  Neurology: Alert awake, and oriented. No tremors.  Extremities: Right LE amputation.  Left knee dressing in place.      Laboratory and Imaging Studies     I have reviewed  laboratory and imaging studies in the Epic. Pertinent findings are as below:    BMP    Recent Labs  Lab 11/03/17  0624 11/02/17  0909     --    POTASSIUM 4.0 4.0   CHLORIDE 105  --    MÓNICA 7.2*  --    CO2 25  --    BUN 23  --    CR 1.16 1.06   * 98     CBC    Recent Labs  Lab 11/04/17  0618 11/03/17  0624   WBC  --  10.1   RBC  --  3.50*   HGB 10.2* 9.9*   HCT  --  30.4*   MCV  --  87   MCH  --  28.3   MCHC  --  32.6   RDW  --  14.4   PLT  --  189     INRNo lab results found in last 7 days.  LFTsNo lab results found in " last 7 days.   PANCNo lab results found in last 7 days.        Impression/Plan        72 year old year old male with hx of Hypothyroidism, Hyperlipidemia,Bladder and Prostate cancer, Depression is being admitted s/p Left TKA on 11/02/2017     # S/P s/p Left TKA on 11/02/2017 :  Management primarily per Ortho team.  - Wound cares, Dressings, Surgical pain management, DVT Prophylaxis  per primary team.   - Monitor anemia, hemodynamics, Input/Output  - Encourage Incentive spirometry  - Laxatives for constipation prophylaxis    # Anemia: Most likely due to acute blood loss, hemodilution  Hg 10.2 on 11/04  - Transfuse if Hg < 7 gm/dl    # Hypotension on 11/03: Asymptomatic. Resolved with IV fluid bolus.   # Hx of Hypothyroidism: PTA on Atorvastatin   - Continue     # Hx of Anxiety, Depression: PTA on Escitalopram  - Continue   # Hx of Hyperlipidemia: PTA no Atorvastatin  - Continue  # Hx of Asthma: PTA on Beclomethasone BID. Reports good control     # Hx of bladder/Prostate cancer: PTA on Tamsulosin, and Finasteride  - Continue     # UTI prevention: Patient reports that his Urologist wanted him to be on Cephalexin for 2 weeks from surgery as preventive measure..   - Cephalexin 500 mg daily            Robert Crane  Internal Medicine/Hospitalist  [facility]  [phone]                          Pt's care was discussed with bedside RN, patient and  during Care Team Rounds.               Robert Crane MD  Hospitalist ( Internal medicine)  Pager: [phone]

## 2017-11-04 NOTE — PROGRESS NOTES
Orthopaedic Surgery Progress Note     Subjective/Events: No acute overnight events. Pain controlled. Tolerating PO, le in place to be removed today.  Drain removed this morning.      Objective:  /59 (BP Location: Left arm)  Pulse 87  Temp 97.5  F (36.4  C) (Oral)  Resp 16  Ht 1.829 m (6')  Wt 94.6 kg (208 lb 8.9 oz)  SpO2 94%  BMI 28.29 kg/m2  Temp (24hrs), Av  F (36.7  C), Min:97.6  F (36.4  C), Max:98.6  F (37  C)      Gen: A&Ox3, no acute distress  CV: 2+ dp/pt pulses, capillary refill < 2sec  Resp: breathing equal and non-labored, no wheezing  RLE AKA  LLE: dressing c/d/i, 5/5 TA GSC EHL FHL, SILT sp, dp, tib, s, s nn; 2+ dp pulse    Assessment: 72M w R AKA and hx of bladder cancer (condom caths at baseline), s/p L TKA /    Activity: Up with assist.  Weight bearing status: AT   Le: remove POD2 and transition back to condom caths  Antibiotics: Clinda x 24 hours.  Diet: Begin with clear fluids and progress diet as tolerated.  Bowel regimen. Anti-emetics PRN.    DVT prophylaxis:  mg  Dressing: changed today  Drains: removed today  Pain management: transition from IV to orals as tolerated.    Physical Therapy/Occupational Therapy  Follow-up: Clinic with Dr. Hill in 2 weeks, x-rays needed.    Dispo: DC to tcu POD3 pending PT    Signed:   Nikko Germain MD  Adult Reconstructive Surgery Fellow  Dept Orthopaedic Surgery, [facility]

## 2017-11-04 NOTE — PLAN OF CARE
Problem: Patient Care Overview  Goal: Plan of Care/Patient Progress Review  Outcome: Improving            VS:    stable   Output:    adequate   Activity:    Up with PT/OT   Skin: Intact except incision( L knee),small OA ,blister ( intact) covered with Mepilex   Pain:    Well managed wit Roxicodone 10 mg po,Ice pack   CMS:    intact   Dressing:    ACE wrap intact   Diet:    regular   LDA:    PCD   Equipment:    CPM, prosthetic leg   Plan:    D/c to [facility]    Additional Info:     Hemovac accidentally pulled out during PT. Blister ,small OA noted on right stump from prothesis. Dr. Crane aware ,wound consult placed. Will continue to monitor.     Dulcolax 10 mg Suppository given at 14:30, no result yet.

## 2017-11-04 NOTE — PLAN OF CARE
Problem: Patient Care Overview  Goal: Plan of Care/Patient Progress Review  Outcome: Improving  A/Ox's 4. Pt rated pain as tolerable. Oxycodone given for pain control. BP remains soft. IV fluids infusing. Pt declined CPM during shift. Dressing CDI. Pt has a Drain. CMS intact. Tolerated regular diet. Denied any nausea, CP, SOB, lightheadedness or dizziness. Pt has a le. Encouraged increased/continued IS use. Bilateral heels elevated off bed. Resting in bed at this time with call light in reach. Able to make needs known. Continue to monitor.

## 2017-11-04 NOTE — PLAN OF CARE
Problem: Patient Care Overview  Goal: Plan of Care/Patient Progress Review  Discharge Planner OT   Patient plan for discharge: TCU  Current status: ModA of 1 sit to stand; ModA of 1 transfer to [facility] using 2WW, VC.  SBA supine <> sit.    Barriers to return to prior living situation: assist needed for all ADLs  Recommendations for discharge: TCU  Rationale for recommendations: level of assist needed       Entered by: LAM JUAREZ 11/04/2017 10:06 AM

## 2017-11-04 NOTE — PROGRESS NOTES
If pt. Ready for d/c Sunday 11/5/17, please call [facility]'s charge nurse (Judith) [phone]  Verify fax # with her [phone]  Cannot arrive before noon. Wife to transport.    On-call Social Work pager # [phone]

## 2017-11-04 NOTE — PLAN OF CARE
Problem: Patient Care Overview  Goal: Interdisciplinary Rounds/Family Conf  Outcome: Improving  Patient A/Ox4. VSS. Denies CP, SOB, dizziness/LH. LSCTA. +fl/BS. Has a le to gravity drainage. CMS intact. Dressing to left knee is CDI, ice applied. Tolerating a regular diet without NV. IS encouraged. Activity is up with the assist of two persons.  IV is infusing to help maintain his blood pressure within acceptable parameters  Patient has demonstrated ability to call appropriately. Patient is resting with call light within reach. Will continue to monitor.

## 2017-11-04 NOTE — PLAN OF CARE
Problem: Patient Care Overview  Goal: Plan of Care/Patient Progress Review  Discharge Planner PT   Patient plan for discharge: Rehab  Current status: Prosthetic donned upon arrival, doing well. Completes supine<>sit transfer with SBA. Sat EOB with good tolerance. Completes sit<>stand transfer with Connie and use of walker from elevated bed. Able to ambulate into flowers with walker and CGA to Connie, mild instability noted. With ambulated R prosthetic noted to be ill fitting, unable to gain suction with R stump. Appears that in wearing his  for an extended period of time his R stump is almost too small? Also noted 2 areas of skin breakdown, nurse notified. Participated in therex per protocol. L knee AAROM=0-8-83  Barriers to return to prior living situation: safety, independence, stairs, decreased L LE strength and ROM, history of R AKA  Recommendations for discharge: Rehab  Rationale for recommendations: To progress safety and independence with functional mobility prior to returning home       Entered by: [place] Vo 11/04/2017 12:43 PM

## 2017-11-05 ENCOUNTER — APPOINTMENT (OUTPATIENT)
Dept: PHYSICAL THERAPY | Facility: CLINIC | Age: 72
DRG: 470 | End: 2017-11-05
Attending: ORTHOPAEDIC SURGERY
Payer: MEDICARE

## 2017-11-05 VITALS
HEIGHT: 72 IN | SYSTOLIC BLOOD PRESSURE: 134 MMHG | HEART RATE: 97 BPM | OXYGEN SATURATION: 95 % | DIASTOLIC BLOOD PRESSURE: 68 MMHG | RESPIRATION RATE: 16 BRPM | WEIGHT: 208.56 LBS | TEMPERATURE: 96.5 F | BODY MASS INDEX: 28.25 KG/M2

## 2017-11-05 PROCEDURE — A9270 NON-COVERED ITEM OR SERVICE: HCPCS | Mod: GY | Performed by: INTERNAL MEDICINE

## 2017-11-05 PROCEDURE — 99231 SBSQ HOSP IP/OBS SF/LOW 25: CPT | Performed by: INTERNAL MEDICINE

## 2017-11-05 PROCEDURE — 25000132 ZZH RX MED GY IP 250 OP 250 PS 637: Mod: GY | Performed by: ORTHOPAEDIC SURGERY

## 2017-11-05 PROCEDURE — 40000193 ZZH STATISTIC PT WARD VISIT

## 2017-11-05 PROCEDURE — 97116 GAIT TRAINING THERAPY: CPT | Mod: GP

## 2017-11-05 PROCEDURE — 25000132 ZZH RX MED GY IP 250 OP 250 PS 637: Mod: GY | Performed by: INTERNAL MEDICINE

## 2017-11-05 PROCEDURE — A9270 NON-COVERED ITEM OR SERVICE: HCPCS | Mod: GY | Performed by: ORTHOPAEDIC SURGERY

## 2017-11-05 PROCEDURE — 97530 THERAPEUTIC ACTIVITIES: CPT | Mod: GP

## 2017-11-05 RX ORDER — OXYCODONE HYDROCHLORIDE 5 MG/1
5-10 TABLET ORAL
Qty: 80 TABLET | Refills: 0 | Status: SHIPPED | OUTPATIENT
Start: 2017-11-05 | End: 2017-12-11

## 2017-11-05 RX ORDER — OXYCODONE HYDROCHLORIDE 5 MG/1
5-10 TABLET ORAL
Status: DISCONTINUED | OUTPATIENT
Start: 2017-11-05 | End: 2017-11-05 | Stop reason: HOSPADM

## 2017-11-05 RX ORDER — ATORVASTATIN CALCIUM 10 MG/1
10 TABLET, FILM COATED ORAL DAILY
Qty: 30 TABLET | DISCHARGE
Start: 2017-11-05

## 2017-11-05 RX ORDER — CEPHALEXIN 500 MG/1
500 CAPSULE ORAL DAILY
Qty: 11 CAPSULE | Refills: 0 | Status: SHIPPED | OUTPATIENT
Start: 2017-11-06 | End: 2017-12-11

## 2017-11-05 RX ADMIN — ACETAMINOPHEN 975 MG: 325 TABLET, FILM COATED ORAL at 08:36

## 2017-11-05 RX ADMIN — SENNOSIDES AND DOCUSATE SODIUM 2 TABLET: 8.6; 5 TABLET ORAL at 08:37

## 2017-11-05 RX ADMIN — FLUTICASONE FUROATE 1 PUFF: 100 POWDER RESPIRATORY (INHALATION) at 08:40

## 2017-11-05 RX ADMIN — TAMSULOSIN HYDROCHLORIDE 0.4 MG: 0.4 CAPSULE ORAL at 08:36

## 2017-11-05 RX ADMIN — OXYCODONE HYDROCHLORIDE 10 MG: 5 TABLET ORAL at 06:26

## 2017-11-05 RX ADMIN — OXYCODONE HYDROCHLORIDE 10 MG: 5 TABLET ORAL at 13:00

## 2017-11-05 RX ADMIN — ESCITALOPRAM OXALATE 20 MG: 10 TABLET ORAL at 08:36

## 2017-11-05 RX ADMIN — CEPHALEXIN 500 MG: 500 CAPSULE ORAL at 08:36

## 2017-11-05 RX ADMIN — ASPIRIN 325 MG: 325 TABLET, DELAYED RELEASE ORAL at 08:36

## 2017-11-05 RX ADMIN — OXYCODONE HYDROCHLORIDE 10 MG: 5 TABLET ORAL at 02:30

## 2017-11-05 RX ADMIN — OXYCODONE HYDROCHLORIDE 10 MG: 5 TABLET ORAL at 10:03

## 2017-11-05 RX ADMIN — Medication 125 MCG: at 08:36

## 2017-11-05 RX ADMIN — FINASTERIDE 5 MG: 5 TABLET, FILM COATED ORAL at 08:36

## 2017-11-05 NOTE — PLAN OF CARE
Problem: Patient Care Overview  Goal: Plan of Care/Patient Progress Review  Outcome: Adequate for Discharge Date Met:  11/05/17  Patient verbalized readiness for discharge. BP stable. Pain well managed with Roxicodone 10 mg po . Wife Ema will drive/bring patient to [facility]. Discharge papers, patient belongings sent.

## 2017-11-05 NOTE — PLAN OF CARE
Problem: Patient Care Overview  Goal: Interdisciplinary Rounds/Family Conf  Outcome: Improving  Patient A/Ox4. VSS. Denies CP, SOB, dizziness/LH. LSCTA. +fl/BS. Voiding with condom cath to gravity drainage to leg bag. CMS intact. Dressing to left leg is CDI, ice applied. Tolerating a regular diet without NV. IS encouraged. Activity is up with assist and using his prosthesis to ambulate. Pain has been managed with oxycodone 10 mg.  Patient has demonstrated ability to call appropriately. Patient is resting with call light within reach. Will continue to monitor.

## 2017-11-05 NOTE — PROGRESS NOTES
"[facility], [place]   Internal Medicine Daily Note           Interval History/Events     Overnight events reviewed  Reports doing well.   No nausea, vomiting, chest pain, shortness of breath  No lightheadedness or dizziness.   Had BM yesterday  Tolerating diet well.        Review of Systems        4 point ROS including Respiratory, CV, GI and , other than that noted above is negative      Medications   I have reviewed current medications  in the \"current medication\" section of Epic.  Relevant changes include:     Physical Exam   General:       Vital signs:    Blood pressure 134/68, pulse 97, temperature 96.5  F (35.8  C), temperature source Axillary, resp. rate 16, height 1.829 m (6'), weight 94.6 kg (208 lb 8.9 oz), SpO2 95 %.  Estimated body mass index is 28.29 kg/(m^2) as calculated from the following:    Height as of this encounter: 1.829 m (6').    Weight as of this encounter: 94.6 kg (208 lb 8.9 oz).      Intake/Output Summary (Last 24 hours) at 11/03/17 1223  Last data filed at 11/03/17 0508   Gross per 24 hour   Intake           1426.7 ml   Output             1230 ml   Net            196.7 ml      HEENT: No icterus, no pallor  Cardiovascular: S1, S2 normal.   Respiratory: B/L CTA  Abdomen: Soft, NT, BS+  Neurology: Alert awake, and oriented. No tremors.  Extremities: Right LE amputation.  Left knee dressing in place.      Laboratory and Imaging Studies     I have reviewed  laboratory and imaging studies in the Epic. Pertinent findings are as below:    BMP    Recent Labs  Lab 11/03/17  0624 11/02/17  0909     --    POTASSIUM 4.0 4.0   CHLORIDE 105  --    MÓNICA 7.2*  --    CO2 25  --    BUN 23  --    CR 1.16 1.06   * 98     CBC    Recent Labs  Lab 11/04/17  0618 11/03/17  0624   WBC  --  10.1   RBC  --  3.50*   HGB 10.2* 9.9*   HCT  --  30.4*   MCV  --  87   MCH  --  28.3   MCHC  --  32.6   RDW  --  14.4   PLT  --  189     INRNo lab results found in last 7 " days.  LFTsNo lab results found in last 7 days.   PANCNo lab results found in last 7 days.        Impression/Plan        72 year old year old male with hx of Hypothyroidism, Hyperlipidemia,Bladder and Prostate cancer, Depression is being admitted s/p Left TKA on 11/02/2017     # S/P s/p Left TKA on 11/02/2017 :  Management primarily per Ortho team.  - Wound cares, Dressings, Surgical pain management, DVT Prophylaxis  per primary team.   - Monitor anemia, hemodynamics, Input/Output  - Encourage Incentive spirometry  - Laxatives for constipation prophylaxis    # Anemia: Most likely due to acute blood loss, hemodilution  Hg 10.2 on 11/04  - Transfuse if Hg < 7 gm/dl    # Hypotension on 11/03: Asymptomatic. Resolved with IV fluid bolus.   # Hx of Hypothyroidism: PTA on Atorvastatin   - Continue     # Hx of Anxiety, Depression: PTA on Escitalopram  - Continue   # Hx of Hyperlipidemia: PTA no Atorvastatin  - Continue  # Hx of Asthma: PTA on Beclomethasone BID. Reports good control     # Hx of bladder/Prostate cancer: PTA on Tamsulosin, and Finasteride  - Continue     # UTI prevention: Patient reports that his Urologist wanted him to be on Cephalexin for 2 weeks from surgery as preventive measure..   - Cephalexin 500 mg daily            Robert Crane  Internal Medicine/Hospitalist  [facility]  [phone]        Pt's care was discussed with bedside RN, patient and  during Care Team Rounds.               Robert Crane MD  Hospitalist ( Internal medicine)  Pager: [phone]

## 2017-11-06 ENCOUNTER — TELEPHONE (OUTPATIENT)
Dept: ORTHOPEDICS | Facility: CLINIC | Age: 72
End: 2017-11-06

## 2017-11-08 ENCOUNTER — TELEPHONE (OUTPATIENT)
Dept: ORTHOPEDICS | Facility: CLINIC | Age: 72
End: 2017-11-08

## 2017-11-08 NOTE — TELEPHONE ENCOUNTER
TCU called to say that Palomo's dressing fell off.  He will have daily dry dressing changes and his leg will be wrapped with an ace.  He will FU in clinic next week.

## 2017-11-10 DIAGNOSIS — Z96.652 STATUS POST TOTAL LEFT KNEE REPLACEMENT: Primary | ICD-10-CM

## 2017-11-16 ENCOUNTER — OFFICE VISIT (OUTPATIENT)
Dept: ORTHOPEDICS | Facility: CLINIC | Age: 72
End: 2017-11-16

## 2017-11-16 DIAGNOSIS — Z96.652 STATUS POST TOTAL LEFT KNEE REPLACEMENT: Primary | ICD-10-CM

## 2017-11-16 NOTE — MR AVS SNAPSHOT
After Visit Summary   2017    Palomo Zapata    MRN: [id number]           Patient Information     Date Of Birth          1945        Visit Information        Provider Department      2017 12:30 PM Brissa Chao APRN CNP [facility]        Today's Diagnoses     Status post total left knee replacement    -  1       Follow-ups after your visit        Your next 10 appointments already scheduled     Dec 11, 2017  1:15 PM CST   (Arrive by 1:00 PM)   Return Visit with MD CHACE Ramirez [facility] ([facility])    [address] [place] [id number] [phone]              Who to contact     Please call your clinic at [phone] to:    Ask questions about your health    Make or cancel appointments    Discuss your medicines    Learn about your test results    Speak to your doctor   If you have compliments or concerns about an experience at your clinic, or if you wish to file a complaint, please contact [facility] Patient Relations at [phone] or email us at [email or web address]         Additional Information About Your Visit        MyChart Information     [employer] is an electronic gateway that provides easy, online access to your medical records. With [employer], you can request a clinic appointment, read your test results, renew a prescription or communicate with your care team.     To sign up for [employer] visit the website at [email or web address]   You will be asked to enter the access code listed below, as well as some personal information. Please follow the directions to create your username and password.     Your access code is: [email or web address]  Expires: 2018  5:30 AM     Your access code will  in 90 days. If you need help or a new code, please contact your [facility] or call [phone] for assistance.        Care  EveryWhere ID     This is your Care EveryWhere ID. This could be used by other organizations to access your [place] medical records  [id number]         Blood Pressure from Last 3 Encounters:   11/05/17 134/68   07/03/16 116/68    Weight from Last 3 Encounters:   11/02/17 94.6 kg (208 lb 8.9 oz)   07/03/16 78 kg (172 lb)   04/01/16 77.6 kg (171 lb)              Today, you had the following     No orders found for display       Primary Care Provider Office Phone # Fax #    [facility] [phone] [address] [place] [address]        Equal Access to Services     MELVIN OLSEN : Juan gonzalez Sonoah, watranda bautista, qaybta kaalmada adebenjayasuyapa, kristopher ortez. So [facility] [phone].    ATENCIÓN: Si habla español, tiene a maciel disposición servicios gratuitos de asistencia lingüística. Llame al [phone].    We comply with applicable federal civil rights laws and [place] laws. We do not discriminate on the basis of race, color, national origin, age, disability, sex, sexual orientation, or gender identity.            Thank you!     Thank you for choosing [facility]  for your care. Our goal is always to provide you with excellent care. Hearing back from our patients is one way we can continue to improve our services. Please take a few minutes to complete the written survey that you may receive in the mail after your visit with us. Thank you!             Your Updated Medication List - Protect others around you: Learn how to safely use, store and throw away your medicines at www.disposemymeds.org.          This list is accurate as of: 11/16/17  1:04 PM.  Always use your most recent med list.                   Brand Name Dispense Instructions for use Diagnosis    acetaminophen 325 MG tablet    TYLENOL    100 tablet    Take 2 tablets (650 mg) by mouth every 4 hours as needed for other (surgical pain)    Status post knee surgery        aspirin 325 MG EC tablet     28 tablet    Take 1 tablet (325 mg) by mouth daily for 28 days    Status post knee surgery       atorvastatin 10 MG tablet    LIPITOR    30 tablet    Take 1 tablet (10 mg) by mouth daily    Hyperlipidemia LDL goal <100       beclomethasone 40 MCG/ACT Inhaler    QVAR     Inhale 1 puff into the lungs 2 times daily        cephALEXin 500 MG capsule    KEFLEX    11 capsule    Take 1 capsule (500 mg) by mouth daily    Need for prophylaxis against urinary tract infection       FLOMAX 0.4 MG capsule   Generic drug:  tamsulosin      Take 0.4 mg by mouth daily        latanoprost 0.005 % ophthalmic solution    XALATAN     Place 1 drop Into the left eye At Bedtime        LEVOTHYROXINE SODIUM PO      Take 125 mcg by mouth daily        LEXAPRO PO      Take 20 mg by mouth daily        oxyCODONE IR 5 MG tablet    ROXICODONE    80 tablet    Take 1-2 tablets (5-10 mg) by mouth every 3 hours as needed for moderate to severe pain    Status post knee surgery       PROSCAR PO      Take 5 mg by mouth daily        senna-docusate 8.6-50 MG per tablet    SENOKOT-S;PERICOLACE    40 tablet    Take 1-2 tablets by mouth 2 times daily    Status post knee surgery

## 2017-11-16 NOTE — LETTER
"11/16/2017       RE: Palomo Zapata  [address] [facility] [address]     Dear Colleague,    Thank you for referring your patient, Palomo Zapata, to the [facility] at [facility]. Please see a copy of my visit note below.    Palomo is seen 2 weeks post op of his left TKA. DOS: 11/3/2017. Reports \"doing well I think\". Incision is seen/clean intact without drainage. Mild effusion noted. Strength improving. Able to SLR with an intact quad mechanism. No calf pain. Walking well. No nausea or CP. Doing OP PT and is being discharged today from a TCU.     Xrays taken that show good position of total knee arthroplasty with good alignment of tibial and femoral components without fracture or acute lucency.    Dx: L TKA    Plan:  1. OP PT. Continue pain meds as needed. Compression sock with ice and elevation.  2. F/u 1 month for xrays on arrival.     Again, thank you for allowing me to participate in the care of your patient.      Sincerely,    Brissa Chao, KINGSTON CNP      "

## 2017-11-16 NOTE — PROGRESS NOTES
"Palomo is seen 2 weeks post op of his left TKA. DOS: 11/3/2017. Reports \"doing well I think\". Incision is seen/clean intact without drainage. Mild effusion noted. Strength improving. Able to SLR with an intact quad mechanism. No calf pain. Walking well. No nausea or CP. Doing OP PT and is being discharged today from a TCU.     Xrays taken that show good position of total knee arthroplasty with good alignment of tibial and femoral components without fracture or acute lucency.    Dx: L TKA    Plan:  1. OP PT. Continue pain meds as needed. Compression sock with ice and elevation.  2. F/u 1 month for xrays on arrival.   "

## 2017-11-27 ENCOUNTER — TRANSFERRED RECORDS (OUTPATIENT)
Dept: HEALTH INFORMATION MANAGEMENT | Facility: CLINIC | Age: 72
End: 2017-11-27

## 2017-12-01 ASSESSMENT — ENCOUNTER SYMPTOMS
PANIC: 0
DECREASED CONCENTRATION: 0
DYSURIA: 0
FLANK PAIN: 0
DEPRESSION: 1
INSOMNIA: 1
DIFFICULTY URINATING: 0
HEMATURIA: 0

## 2017-12-04 DIAGNOSIS — Z96.652 STATUS POST TOTAL LEFT KNEE REPLACEMENT: Primary | ICD-10-CM

## 2017-12-11 ENCOUNTER — TRANSFERRED RECORDS (OUTPATIENT)
Dept: HEALTH INFORMATION MANAGEMENT | Facility: CLINIC | Age: 72
End: 2017-12-11

## 2017-12-11 ENCOUNTER — OFFICE VISIT (OUTPATIENT)
Dept: ORTHOPEDICS | Facility: CLINIC | Age: 72
End: 2017-12-11

## 2017-12-11 DIAGNOSIS — Z96.652 STATUS POST TOTAL LEFT KNEE REPLACEMENT: Primary | ICD-10-CM

## 2017-12-11 NOTE — PROGRESS NOTES
Palomo is seen 6 weeks post op of his left TKA.  DOS: 11/3/2017. He reports minimal discomfort. Doing formal PT twice weekly and daily at home. Taking nothing for pain except occasional tylenol. No new concerns. Afebrile. Walking daily. MInimal night pain. Incision is seen/clean/healed and intact. No calf pain. + CMS. Minimal swelling noted. Mild erythema as expected.    Xrays including a full length were taken show neutral alignment with a well positioned left total knee arthroplasty without lucency or acute fractures.    Dx: L TKA    Plan:  1. Advance activity as tolerated.   2. No dental work for 6 months.  3. F/u 4 months for repeat xrays or sooner if needed.    I, Dr. Karlos Hill,  have seen and examined this patient with KINGSTON Gordon, CNP.

## 2017-12-11 NOTE — MR AVS SNAPSHOT
After Visit Summary   12/11/2017    Palomo Zapata    MRN: [id number]           Patient Information     Date Of Birth          1945        Visit Information        Provider Department      12/11/2017 1:15 PM Karlos Hill MD [facility]        Today's Diagnoses     Status post total left knee replacement    -  1       Follow-ups after your visit        Your next 10 appointments already scheduled     Mar 19, 2018 10:45 AM CDT   (Arrive by 10:30 AM)   Return Visit with MD CHACE Ramirez [facility] ([facility] [place])    [address] [place] [id number] [phone]              Who to contact     Please call your clinic at [phone] to:    Ask questions about your health    Make or cancel appointments    Discuss your medicines    Learn about your test results    Speak to your doctor   If you have compliments or concerns about an experience at your clinic, or if you wish to file a complaint, please contact [facility] Patient Relations at [phone] or email us at [email or web address]         Additional Information About Your Visit        MyChart Information     [employer] gives you secure access to your electronic health record. If you see a primary care provider, you can also send messages to your care team and make appointments. If you have questions, please call your primary care clinic.  If you do not have a primary care provider, please call [phone] and they will assist you.      [employer] is an electronic gateway that provides easy, online access to your medical records. With [employer], you can request a clinic appointment, read your test results, renew a prescription or communicate with your care team.     To access your existing account, please contact your [facility] or call [phone] for assistance.        Care EveryWhere ID      This is your Care EveryWhere ID. This could be used by other organizations to access your [place] medical records  [id number]         Blood Pressure from Last 3 Encounters:   11/05/17 134/68   07/03/16 116/68    Weight from Last 3 Encounters:   11/02/17 94.6 kg (208 lb 8.9 oz)   07/03/16 78 kg (172 lb)   04/01/16 77.6 kg (171 lb)              Today, you had the following     No orders found for display         Today's Medication Changes          These changes are accurate as of: 12/11/17  1:36 PM.  If you have any questions, ask your nurse or doctor.               Stop taking these medicines if you haven't already. Please contact your care team if you have questions.     cephALEXin 500 MG capsule   Commonly known as:  KEFLEX   Stopped by:  Karlos Hill MD           oxyCODONE IR 5 MG tablet   Commonly known as:  ROXICODONE   Stopped by:  Karlos Hill MD           senna-docusate 8.6-50 MG per tablet   Commonly known as:  SENOKOT-S;PERICOLACE   Stopped by:  Karlos Hill MD                    Primary Care Provider Office Phone # Fax #    [facility] [phone] [address] [place] [address]        Equal Access to Services     ZAYNAB OLSEN AH: Hadii manolo ku hadasho Soomaali, waaxda luqadaha, qaybta kaalmada adeegyada, waxay idiin hayshaqn osorio ortez. So [facility] [phone].    ATENCIÓN: Si habla español, tiene a maciel disposición servicios gratuitos de asistencia lingüística. Llame al [phone].    We comply with applicable federal civil rights laws and [place] laws. We do not discriminate on the basis of race, color, national origin, age, disability, sex, sexual orientation, or gender identity.            Thank you!     Thank you for choosing [facility]  for your care. Our goal is always to provide you with excellent care. Hearing back from our patients is one way we can continue to improve our services. Please take a few minutes to  complete the written survey that you may receive in the mail after your visit with us. Thank you!             Your Updated Medication List - Protect others around you: Learn how to safely use, store and throw away your medicines at www.disposemymeds.org.          This list is accurate as of: 12/11/17  1:36 PM.  Always use your most recent med list.                   Brand Name Dispense Instructions for use Diagnosis    acetaminophen 325 MG tablet    TYLENOL    100 tablet    Take 2 tablets (650 mg) by mouth every 4 hours as needed for other (surgical pain)    Status post knee surgery       atorvastatin 10 MG tablet    LIPITOR    30 tablet    Take 1 tablet (10 mg) by mouth daily    Hyperlipidemia LDL goal <100       beclomethasone 40 MCG/ACT Inhaler    QVAR     Inhale 1 puff into the lungs 2 times daily        FLOMAX 0.4 MG capsule   Generic drug:  tamsulosin      Take 0.4 mg by mouth daily        latanoprost 0.005 % ophthalmic solution    XALATAN     Place 1 drop Into the left eye At Bedtime        LEVOTHYROXINE SODIUM PO      Take 125 mcg by mouth daily        LEXAPRO PO      Take 20 mg by mouth daily        PROSCAR PO      Take 5 mg by mouth daily

## 2017-12-11 NOTE — LETTER
12/11/2017       RE: Palomo Zapata  [address] [facility] [address]     Dear Colleague,    Thank you for referring your patient, Palomo Zapata, to the [facility] at [facility]. Please see a copy of my visit note below.    Palomo is seen 6 weeks post op of his left TKA.  DOS: 11/3/2017. He reports minimal discomfort. Doing formal PT twice weekly and daily at home. Taking nothing for pain except occasional tylenol. No new concerns. Afebrile. Walking daily. MInimal night pain. Incision is seen/clean/healed and intact. No calf pain. + CMS. Minimal swelling noted. Mild erythema as expected.    Xrays including a full length were taken show neutral alignment with a well positioned left total knee arthroplasty without lucency or acute fractures.    Dx: L TKA    Plan:  1. Advance activity as tolerated.   2. No dental work for 6 months.  3. F/u 4 months for repeat xrays or sooner if needed.    I, Dr. Karlos Hill,  have seen and examined this patient with KINGSTON Gordon, CNP.      Again, thank you for allowing me to participate in the care of your patient.      Sincerely,    Karlos Hill MD

## 2018-03-13 DIAGNOSIS — Z96.652 STATUS POST TOTAL LEFT KNEE REPLACEMENT: Primary | ICD-10-CM

## 2018-03-19 ENCOUNTER — RADIANT APPOINTMENT (OUTPATIENT)
Dept: GENERAL RADIOLOGY | Facility: CLINIC | Age: 73
End: 2018-03-19
Attending: ORTHOPAEDIC SURGERY
Payer: COMMERCIAL

## 2018-03-19 ENCOUNTER — OFFICE VISIT (OUTPATIENT)
Dept: ORTHOPEDICS | Facility: CLINIC | Age: 73
End: 2018-03-19
Payer: COMMERCIAL

## 2018-03-19 DIAGNOSIS — Z96.652 STATUS POST TOTAL LEFT KNEE REPLACEMENT: ICD-10-CM

## 2018-03-19 DIAGNOSIS — Z96.652 STATUS POST TOTAL LEFT KNEE REPLACEMENT: Primary | ICD-10-CM

## 2018-03-19 DIAGNOSIS — G54.6 PHANTOM LIMB PAIN (H): ICD-10-CM

## 2018-03-19 RX ORDER — AMOXICILLIN 500 MG/1
CAPSULE ORAL
Qty: 12 CAPSULE | Refills: 1 | Status: SHIPPED | OUTPATIENT
Start: 2018-03-19 | End: 2018-12-27

## 2018-03-19 RX ORDER — GABAPENTIN 100 MG/1
CAPSULE ORAL
Qty: 30 CAPSULE | Refills: 0 | Status: SHIPPED | OUTPATIENT
Start: 2018-03-19

## 2018-03-19 NOTE — PROGRESS NOTES
"Cc: left TKA follow up    HPI:  Palomo is seen 4 1/2 months post op of his left TKA.  DOS: 11/3/2017.  He reports \"doing fantastic and has no complaints.\" Walking daily. Reports his pain and function is improved. No recent injuries or falls. Rates his knee pain \"1\". Has had continued phantom pain to his right lower extremity s/p amputation that worsens at night after his prosthetic comes off. Is going to dentist soon and is requesting antibiotics.    PMH: Reviewed    ROS: Reviewed    PE: Pleasant and cooperative male alert and oriented x3. Left knee reveals a well healed incision.  ROM: 0-122. No calf pain. No obvious edema or joint effusion. Nontender to palpate. Strength is 5/5 to quad/hamstring. Ligament exam stable without laxity. Alignment is neutral.    Xrays taken:  FINDINGS: AP and lateral views of the left knee were obtained.  Postsurgical changes of a left total knee arthroplasty. The hardware  appears intact.         IMPRESSION: Postsurgical changes of a left total knee arthroplasty,  without complication.    Dx:   1. L TKA--stable  2. R BKA--phantom pain    Plan:  1. Continue HEP/ambulate as tolerated  2. Follow up with prosthetist to ensure proper leg fitting and possibly a night sleeve to help with night neurlagias. Gave rx for neurontin if ok with primary MD for night pain. Rx given for dental work.   3. Follow up prn  Answers for HPI/ROS submitted by the patient on 3/19/2018   General Symptoms: No  Skin Symptoms: No  HENT Symptoms: No  EYE SYMPTOMS: No  HEART SYMPTOMS: No  LUNG SYMPTOMS: No  INTESTINAL SYMPTOMS: No  URINARY SYMPTOMS: No  REPRODUCTIVE SYMPTOMS: No  SKELETAL SYMPTOMS: No  BLOOD SYMPTOMS: No  NERVOUS SYSTEM SYMPTOMS: No  MENTAL HEALTH SYMPTOMS: No    I, Dr. Karlos Hill,  have seen and examined this patient with Brissa Chao, APRN, CNP.    "

## 2018-03-19 NOTE — LETTER
"3/19/2018       RE: Palomo Zapata  [address] [facility] [address]     Dear Colleague,    Thank you for referring your patient, Palomo Zapata, to the [facility] at [facility]. Please see a copy of my visit note below.    Cc: left TKA follow up    HPI:  Palomo is seen 4 1/2 months post op of his left TKA.  DOS: 11/3/2017.  He reports \"doing fantastic and has no complaints.\" Walking daily. Reports his pain and function is improved. No recent injuries or falls. Rates his knee pain \"1\". Has had continued phantom pain to his right lower extremity s/p amputation that worsens at night after his prosthetic comes off. Is going to dentist soon and is requesting antibiotics.    PMH: Reviewed    ROS: Reviewed    PE: Pleasant and cooperative male alert and oriented x3. Left knee reveals a well healed incision.  ROM: 0-122. No calf pain. No obvious edema or joint effusion. Nontender to palpate. Strength is 5/5 to quad/hamstring. Ligament exam stable without laxity. Alignment is neutral.    Xrays taken:  FINDINGS: AP and lateral views of the left knee were obtained.  Postsurgical changes of a left total knee arthroplasty. The hardware  appears intact.         IMPRESSION: Postsurgical changes of a left total knee arthroplasty,  without complication.    Dx:   1. L TKA--stable  2. R BKA--phantom pain    Plan:  1. Continue HEP/ambulate as tolerated  2. Follow up with prosthetist to ensure proper leg fitting and possibly a night sleeve to help with night neurlagias. Gave rx for neurontin if ok with primary MD for night pain. Rx given for dental work.   3. Follow up prn      I, Dr. Karlos Hill,  have seen and examined this patient with Brissa Chao, APRN, CNP.      Again, thank you for allowing me to participate in the care of your patient.      Sincerely,    Karlos Hill MD      "

## 2018-03-19 NOTE — MR AVS SNAPSHOT
After Visit Summary   3/19/2018    Palomo Zapata    MRN: [id number]           Patient Information     Date Of Birth          1945        Visit Information        Provider Department      3/19/2018 10:45 AM Karlos Hill MD [facility]        Today's Diagnoses     Status post total left knee replacement    -  1    Phantom limb pain (H)           Follow-ups after your visit        Who to contact     Please call your clinic at [phone] to:    Ask questions about your health    Make or cancel appointments    Discuss your medicines    Learn about your test results    Speak to your doctor            Additional Information About Your Visit        MyChart Information     [employer] gives you secure access to your electronic health record. If you see a primary care provider, you can also send messages to your care team and make appointments. If you have questions, please call your primary care clinic.  If you do not have a primary care provider, please call [phone] and they will assist you.      [employer] is an electronic gateway that provides easy, online access to your medical records. With [employer], you can request a clinic appointment, read your test results, renew a prescription or communicate with your care team.     To access your existing account, please contact your [facility] or call [phone] for assistance.        Care EveryWhere ID     This is your Care EveryWhere ID. This could be used by other organizations to access your [place] medical records  [id number]         Blood Pressure from Last 3 Encounters:   11/05/17 134/68   07/03/16 116/68    Weight from Last 3 Encounters:   11/02/17 94.6 kg (208 lb 8.9 oz)   07/03/16 78 kg (172 lb)   04/01/16 77.6 kg (171 lb)              Today, you had the following     No orders found for display         Today's Medication Changes          These changes are accurate as of 3/19/18 10:48 AM.  If  you have any questions, ask your nurse or doctor.               Start taking these medicines.        Dose/Directions    amoxicillin 500 MG capsule   Commonly known as:  AMOXIL   Used for:  Status post total left knee replacement   Started by:  Karlos Hill MD        Take 4 pills 1 hour prior to dental work   Quantity:  12 capsule   Refills:  1       gabapentin 100 MG capsule   Commonly known as:  NEURONTIN   Used for:  Phantom limb pain (H)   Started by:  Karlos Hill MD        Take 1 pill po at bedtime   Quantity:  30 capsule   Refills:  0            Where to get your medicines      Some of these will need a paper prescription and others can be bought over the counter.  Ask your nurse if you have questions.     Bring a paper prescription for each of these medications     amoxicillin 500 MG capsule    gabapentin 100 MG capsule                Primary Care Provider Office Phone # Fax #    [facility] [phone] [address] [place] [address]        Equal Access to Services     [facility]: Hadii aad ku hadasho Sonoah, waaxda luqadaha, qaybta kaalmada adeegyada, kristopher reeder haycoy casey . So [facility] [phone].    ATENCIÓN: Si habla español, tiene a maciel disposición servicios gratuitos de asistencia lingüística. Llame al [phone].    We comply with applicable federal civil rights laws and [place] laws. We do not discriminate on the basis of race, color, national origin, age, disability, sex, sexual orientation, or gender identity.            Thank you!     Thank you for choosing [facility]  for your care. Our goal is always to provide you with excellent care. Hearing back from our patients is one way we can continue to improve our services. Please take a few minutes to complete the written survey that you may receive in the mail after your visit with us. Thank you!             Your Updated Medication List - Protect others  around you: Learn how to safely use, store and throw away your medicines at www.disposemymeds.org.          This list is accurate as of 3/19/18 10:48 AM.  Always use your most recent med list.                   Brand Name Dispense Instructions for use Diagnosis    acetaminophen 325 MG tablet    TYLENOL    100 tablet    Take 2 tablets (650 mg) by mouth every 4 hours as needed for other (surgical pain)    Status post knee surgery       amoxicillin 500 MG capsule    AMOXIL    12 capsule    Take 4 pills 1 hour prior to dental work    Status post total left knee replacement       atorvastatin 10 MG tablet    LIPITOR    30 tablet    Take 1 tablet (10 mg) by mouth daily    Hyperlipidemia LDL goal <100       beclomethasone 40 MCG/ACT Inhaler    QVAR     Inhale 1 puff into the lungs 2 times daily        FLOMAX 0.4 MG capsule   Generic drug:  tamsulosin      Take 0.4 mg by mouth daily        gabapentin 100 MG capsule    NEURONTIN    30 capsule    Take 1 pill po at bedtime    Phantom limb pain (H)       latanoprost 0.005 % ophthalmic solution    XALATAN     Place 1 drop Into the left eye At Bedtime        LEVOTHYROXINE SODIUM PO      Take 125 mcg by mouth daily        LEXAPRO PO      Take 20 mg by mouth daily        PROSCAR PO      Take 5 mg by mouth daily

## 2018-12-27 DIAGNOSIS — Z96.652 STATUS POST TOTAL LEFT KNEE REPLACEMENT: ICD-10-CM

## 2018-12-27 RX ORDER — AMOXICILLIN 500 MG/1
CAPSULE ORAL
Qty: 12 CAPSULE | Refills: 1 | Status: SHIPPED | OUTPATIENT
Start: 2018-12-27

## 2018-12-28 NOTE — TELEPHONE ENCOUNTER
Palomo underwent LTKA 11/3/17.  Rx for amoxicillin before dental was received and filled per standing orders.  Xiomara May RN

## 2019-03-11 ENCOUNTER — HOSPITAL ENCOUNTER (OUTPATIENT)
Dept: PET IMAGING | Facility: CLINIC | Age: 74
Discharge: HOME | End: 2019-03-11
Attending: UROLOGY | Admitting: UROLOGY
Payer: MEDICARE

## 2019-03-11 DIAGNOSIS — C67.2 MALIGNANT NEOPLASM OF LATERAL WALL OF URINARY BLADDER (H): ICD-10-CM

## 2019-03-11 LAB — GLUCOSE BLDC GLUCOMTR-MCNC: 92 MG/DL (ref 70–99)

## 2019-03-11 PROCEDURE — A9552 F18 FDG: HCPCS

## 2019-03-11 PROCEDURE — 78816 PET IMAGE W/CT FULL BODY: CPT | Mod: PI

## 2019-03-11 PROCEDURE — 34300033 ZZH RX 343

## 2019-03-11 PROCEDURE — 82962 GLUCOSE BLOOD TEST: CPT

## 2019-03-11 RX ADMIN — FLUDEOXYGLUCOSE F-18 14.1 MCI.: 500 INJECTION, SOLUTION INTRAVENOUS at 12:08

## 2019-10-01 ENCOUNTER — HEALTH MAINTENANCE LETTER (OUTPATIENT)
Age: 74
End: 2019-10-01

## 2019-12-15 ENCOUNTER — HEALTH MAINTENANCE LETTER (OUTPATIENT)
Age: 74
End: 2019-12-15

## 2020-05-19 ENCOUNTER — HOSPITAL ENCOUNTER (OUTPATIENT)
Dept: GENERAL RADIOLOGY | Facility: CLINIC | Age: 75
Discharge: HOME | End: 2020-05-19
Attending: UROLOGY | Admitting: UROLOGY
Payer: MEDICARE

## 2020-05-19 DIAGNOSIS — N13.30 HYDRONEPHROSIS, RIGHT: ICD-10-CM

## 2020-05-19 PROCEDURE — 50431 NJX PX NFROSGRM &/URTRGRM: CPT

## 2020-05-19 PROCEDURE — 25500064 ZZH RX 255 OP 636: Performed by: UROLOGY

## 2020-05-19 RX ADMIN — DIATRIZOATE MEGLUMINE 300 ML: 180 INJECTION, SOLUTION INTRAVESICAL at 12:30

## 2020-05-19 NOTE — IR NOTE
XR LOOPOGRAM  5/19/2020 12:34 PM     HISTORY:  Patient has a history of bladder cancer status post cystectomy with ileal diversion. Patient was noted to have right-sided hydronephrosis.    COMPARISON: CT scan of the abdomen pelvis dated 3/11/2019    FINDINGS: The patient was placed in a supine position on the fluoroscopy table. The stoma for the ileal diversion was prepped and draped in the usual sterile manner. A 5 [place] TORO 1 catheter was able to be passed into the ileal diversion. The catheter spontaneously entered the mid distal left ureter. Contrast was injected and showed opacification of the left intrarenal collecting system as well as patency of the distal left ureter. Contrast was then injected into the ileal diversion. No evidence for reflux of contrast into the right ureter was identified. At this time, the catheter was removed.    Fluoroscopy time: 2 minutes 54 seconds    Radiation dose: 35 mGy Air Kerma    Contrast: 1 50 cc Isovue-300, 50 cc Cystografin    IMPRESSION: The left ureter is widely patent. There is no reflux of contrast into the right ureter. This suggests obstruction of the distal right ureter.

## 2020-05-26 ENCOUNTER — APPOINTMENT (OUTPATIENT)
Dept: INTERVENTIONAL RADIOLOGY/VASCULAR | Facility: CLINIC | Age: 75
End: 2020-05-26
Attending: UROLOGY
Payer: MEDICARE

## 2020-05-26 ENCOUNTER — HOSPITAL ENCOUNTER (OUTPATIENT)
Facility: CLINIC | Age: 75
Discharge: HOME | End: 2020-05-26
Attending: RADIOLOGY | Admitting: RADIOLOGY
Payer: MEDICARE

## 2020-05-26 VITALS
RESPIRATION RATE: 13 BRPM | OXYGEN SATURATION: 98 % | DIASTOLIC BLOOD PRESSURE: 74 MMHG | SYSTOLIC BLOOD PRESSURE: 118 MMHG | TEMPERATURE: 97.6 F

## 2020-05-26 DIAGNOSIS — N17.9 AKI (ACUTE KIDNEY INJURY) (H): ICD-10-CM

## 2020-05-26 PROCEDURE — C1887 CATHETER, GUIDING: HCPCS

## 2020-05-26 PROCEDURE — 25000125 ZZHC RX 250: Performed by: RADIOLOGY

## 2020-05-26 PROCEDURE — 76937 US GUIDE VASCULAR ACCESS: CPT

## 2020-05-26 PROCEDURE — 99153 MOD SED SAME PHYS/QHP EA: CPT

## 2020-05-26 PROCEDURE — 50706 BALLOON DILATE URTRL STRIX: CPT

## 2020-05-26 PROCEDURE — 25000128 H RX IP 250 OP 636

## 2020-05-26 PROCEDURE — 50433 PLMT NEPHROURETERAL CATHETER: CPT

## 2020-05-26 PROCEDURE — 27210912 ZZH NEEDLE CR8

## 2020-05-26 PROCEDURE — 25000128 H RX IP 250 OP 636: Performed by: RADIOLOGY

## 2020-05-26 PROCEDURE — C1725 CATH, TRANSLUMIN NON-LASER: HCPCS

## 2020-05-26 PROCEDURE — 27210886 ZZH ACCESSORY CR5

## 2020-05-26 PROCEDURE — 99152 MOD SED SAME PHYS/QHP 5/>YRS: CPT

## 2020-05-26 PROCEDURE — 27210802 ZZH SHEATH CR1

## 2020-05-26 PROCEDURE — C1769 GUIDE WIRE: HCPCS

## 2020-05-26 RX ORDER — LIDOCAINE HYDROCHLORIDE 10 MG/ML
INJECTION, SOLUTION EPIDURAL; INFILTRATION; INTRACAUDAL; PERINEURAL
Status: DISCONTINUED
Start: 2020-05-26 | End: 2020-05-26 | Stop reason: HOSPADM

## 2020-05-26 RX ORDER — LIDOCAINE 40 MG/G
CREAM TOPICAL
Status: DISCONTINUED | OUTPATIENT
Start: 2020-05-26 | End: 2020-05-26 | Stop reason: HOSPADM

## 2020-05-26 RX ORDER — FLUMAZENIL 0.1 MG/ML
0.2 INJECTION, SOLUTION INTRAVENOUS
Status: DISCONTINUED | OUTPATIENT
Start: 2020-05-26 | End: 2020-05-26 | Stop reason: HOSPADM

## 2020-05-26 RX ORDER — NICOTINE POLACRILEX 4 MG
15-30 LOZENGE BUCCAL
Status: DISCONTINUED | OUTPATIENT
Start: 2020-05-26 | End: 2020-05-26 | Stop reason: HOSPADM

## 2020-05-26 RX ORDER — NALOXONE HYDROCHLORIDE 0.4 MG/ML
.1-.4 INJECTION, SOLUTION INTRAMUSCULAR; INTRAVENOUS; SUBCUTANEOUS
Status: DISCONTINUED | OUTPATIENT
Start: 2020-05-26 | End: 2020-05-26 | Stop reason: HOSPADM

## 2020-05-26 RX ORDER — DEXTROSE MONOHYDRATE 25 G/50ML
25-50 INJECTION, SOLUTION INTRAVENOUS
Status: DISCONTINUED | OUTPATIENT
Start: 2020-05-26 | End: 2020-05-26 | Stop reason: HOSPADM

## 2020-05-26 RX ORDER — CEFTAZIDIME 1 G/1
INJECTION, POWDER, FOR SOLUTION INTRAMUSCULAR; INTRAVENOUS
Status: COMPLETED
Start: 2020-05-26 | End: 2020-05-26

## 2020-05-26 RX ORDER — CEFTAZIDIME 1 G/1
1 INJECTION, POWDER, FOR SOLUTION INTRAMUSCULAR; INTRAVENOUS
Status: DISCONTINUED | OUTPATIENT
Start: 2020-05-26 | End: 2020-05-26 | Stop reason: HOSPADM

## 2020-05-26 RX ORDER — FENTANYL CITRATE 50 UG/ML
25-50 INJECTION, SOLUTION INTRAMUSCULAR; INTRAVENOUS EVERY 5 MIN PRN
Status: DISCONTINUED | OUTPATIENT
Start: 2020-05-26 | End: 2020-05-26 | Stop reason: HOSPADM

## 2020-05-26 RX ORDER — FENTANYL CITRATE 50 UG/ML
INJECTION, SOLUTION INTRAMUSCULAR; INTRAVENOUS
Status: DISCONTINUED
Start: 2020-05-26 | End: 2020-05-26 | Stop reason: HOSPADM

## 2020-05-26 RX ADMIN — LIDOCAINE HYDROCHLORIDE 10 ML: 10 INJECTION, SOLUTION EPIDURAL; INFILTRATION; INTRACAUDAL; PERINEURAL at 09:23

## 2020-05-26 RX ADMIN — MIDAZOLAM HYDROCHLORIDE 2 MG: 1 INJECTION, SOLUTION INTRAMUSCULAR; INTRAVENOUS at 08:29

## 2020-05-26 RX ADMIN — FENTANYL CITRATE 100 MCG: 50 INJECTION, SOLUTION INTRAMUSCULAR; INTRAVENOUS at 08:29

## 2020-05-26 RX ADMIN — CEFTAZIDIME 1 G: 1 INJECTION, POWDER, FOR SOLUTION INTRAMUSCULAR; INTRAVENOUS at 08:23

## 2020-05-26 NOTE — PROGRESS NOTES
Ordering Clinic: Dr. Cuevsa after conversing with Dr. Cox (urology)  Procedure: 1. nephrostogram then 2. ureteral stent 26cm 8FR, change from Nephroureteral to Ureteral (pt has a urostomy) 3. Remove NT 4. Use sedation  Arrival date: 5.28.2020  Arrival time: 0900  NPO explained: yes                 Does patient have transportation available pre and post procedure?   yes  Check-in procedure explained: yes  Allergies reviewed: no  Blood thinners: no  Labs: no  H&P:  See epic  Does patient require ?    no  If so, language?      services called to order ?    date requested:    arrival time requested:    Name of individual from  services assisting with scheduling appointment:    Previous sedation:  Last oral intake:    solid: ________  Liquids: _______

## 2020-05-26 NOTE — PRE-PROCEDURE
GENERAL PRE-PROCEDURE:   Procedure:  Rigth nephrostomy tube and attempt at ureteral stent placement  Date/Time:  5/26/2020 8:15 AM    Verbal consent obtained?: Yes    Written consent obtained?: Yes    Risks and benefits: Risks, benefits and alternatives were discussed    Consent given by:  Patient  Patient states understanding of procedure being performed: Yes    Patient's understanding of procedure matches consent: Yes    Procedure consent matches procedure scheduled: Yes    Expected level of sedation:  Moderate  Appropriately NPO:  Yes  ASA Class:  Class 3- Severe systemic disease, definite functional limitations  Mallampati  :  Grade 3- soft palate visible, posterior pharyngeal wall not visible  Lungs:  Lungs clear with good breath sounds bilaterally  Heart:  Normal heart sounds and rate  History & Physical reviewed:  History and physical reviewed and no updates needed  Statement of review:  I have reviewed the lab findings, diagnostic data, medications, and the plan for sedation

## 2020-05-26 NOTE — DISCHARGE INSTRUCTIONS
Invasive Radiology Procedures Discharge Instructions         _____________________________________________________________________    Patient Name:  Palomo Zapata  Today's Date:  May 26, 2020    The doctor who did your procedure today was Dr. Cuevas.    Procedure:  Nephrostomy Tube       If you have not received your results after 5 days, please call the doctor who ordered your test.  Diet and medicines    Go back to your normal diet.    You may start taking your normal medicines again (including Coumadin, or warfarin), as shown on your medicine sheet.    If you take aspirin, Plavix or other anti-platelet drugs: Start taking it tomorrow.    If take Coumadin (warfarin): Ask your doctor when to have your INR checked.    For minor pain, you may take Tylenol (acetaminophen) or Advil (ibuprofen).    Activity and puncture site     You may go back to normal activity in 24 hours. Wait 48 hours before lifting,   straining, exercise or other strenuous activity.    For the next day or two, check your puncture site often while you are awake.    Change the Band-Aid or bandage tomorrow.    You may shower tomorrow morning. No bathing or swimming until your   puncture site has fully healed.    If you received IV medicine to sedate you:  You were given: Versed  and Fentanyl  You may feel drowsy, forgetful or unsteady. For the next 24 hours, do not drive, drink alcohol or make any important decisions.    Know when to call for help  Call your doctor if you have:    A fever greater over 101 F (38.3 C), taken under the tongue.    A lot of bleeding or swelling at your puncture site.    Pain that is getting worse.     Shortness of breath.  Call 911 or go the emergency room if you have:    Severe chest pain or trouble breathing.    A tube that falls out.     Increased blood in your sputum (phlegm).    Bleeding that you cannot control.   Important phone numbers:  [facility] at [phone]

## 2020-05-26 NOTE — IP AVS SNAPSHOT
MRN:[id number]                      After Visit Summary   5/26/2020    Palomo Zapata    MRN: [id number]           Visit Information        Department      5/26/2020  6:59 AM [facility]          Review of your medicines      UNREVIEWED medicines. Ask your doctor about these medicines       Dose / Directions   acetaminophen 325 MG tablet  Commonly known as:  TYLENOL  Used for:  Status post knee surgery      Dose:  650 mg  Take 2 tablets (650 mg) by mouth every 4 hours as needed for other (surgical pain)  Quantity:  100 tablet  Refills:  1     amoxicillin 500 MG capsule  Commonly known as:  AMOXIL  Used for:  Status post total left knee replacement      Take 4 pills 1 hour prior to dental work  Quantity:  12 capsule  Refills:  1     atorvastatin 10 MG tablet  Commonly known as:  LIPITOR  Used for:  Hyperlipidemia LDL goal <100      Dose:  10 mg  Take 1 tablet (10 mg) by mouth daily  Quantity:  30 tablet  Refills:  0     beclomethasone 40 MCG/ACT Aers IS A DISCONTINUED MEDICATION  Commonly known as:  QVAR      Dose:  1 puff  Inhale 1 puff into the lungs 2 times daily  Refills:  0     Flomax 0.4 MG capsule  Generic drug:  tamsulosin      Dose:  0.4 mg  Take 0.4 mg by mouth daily  Refills:  0     gabapentin 100 MG capsule  Commonly known as:  NEURONTIN  Used for:  Phantom limb pain (H)      Take 1 pill po at bedtime  Quantity:  30 capsule  Refills:  0     latanoprost 0.005 % ophthalmic solution  Commonly known as:  XALATAN      Dose:  1 drop  Place 1 drop Into the left eye At Bedtime  Refills:  0     LEVOTHYROXINE SODIUM PO      Dose:  125 mcg  Take 125 mcg by mouth daily  Refills:  0     LEXAPRO PO      Dose:  20 mg  Take 20 mg by mouth daily  Refills:  0     PROSCAR PO      Dose:  5 mg  Take 5 mg by mouth daily  Refills:  0              Protect others around you: Learn how to safely use, store and throw away your medicines at www.disposemymeds.org.       Follow-ups after your visit        Care Instructions       Further instructions from your care team         Invasive Radiology Procedures Discharge Instructions         _____________________________________________________________________    Patient Name:  Palomo Zapata  Today's Date:  May 26, 2020    The doctor who did your procedure today was Dr. Cuevas.    Procedure:  Nephrostomy Tube       If you have not received your results after 5 days, please call the doctor who ordered your test.  Diet and medicines    Go back to your normal diet.    You may start taking your normal medicines again (including Coumadin, or warfarin), as shown on your medicine sheet.    If you take aspirin, Plavix or other anti-platelet drugs: Start taking it tomorrow.    If take Coumadin (warfarin): Ask your doctor when to have your INR checked.    For minor pain, you may take Tylenol (acetaminophen) or Advil (ibuprofen).    Activity and puncture site     You may go back to normal activity in 24 hours. Wait 48 hours before lifting,   straining, exercise or other strenuous activity.    For the next day or two, check your puncture site often while you are awake.    Change the Band-Aid or bandage tomorrow.    You may shower tomorrow morning. No bathing or swimming until your   puncture site has fully healed.    If you received IV medicine to sedate you:  You were given: Versed  and Fentanyl  You may feel drowsy, forgetful or unsteady. For the next 24 hours, do not drive, drink alcohol or make any important decisions.    Know when to call for help  Call your doctor if you have:    A fever greater over 101 F (38.3 C), taken under the tongue.    A lot of bleeding or swelling at your puncture site.    Pain that is getting worse.     Shortness of breath.  Call 911 or go the emergency room if you have:    Severe chest pain or trouble breathing.    A tube that falls out.     Increased blood in your sputum (phlegm).    Bleeding that you cannot control.   Important phone  numbers:  [facility] at [phone]    Additional Information About Your Visit       MyChart Information    [employer] gives you secure access to your electronic health record. If you see a primary care provider, you can also send messages to your care team and make appointments. If you have questions, please call your primary care clinic.  If you do not have a primary care provider, please call [phone] and they will assist you.       Care EveryWhere ID    This is your Care EveryWhere ID. This could be used by other organizations to access your [facility] medical records  [id number]       Your Vitals Were  Most recent update: 5/26/2020  9:17 AM    Blood Pressure   117/63    Temperature   97  F (36.1  C) (Temporal)    Respirations   13    Pulse Oximetry   99%           Primary Care Provider Office Phone # Fax #    [facility] [phone]      Equal Access to Services    [facility]: Hadii aad ku hadasho Soomaali, waaxda luqadaha, qaybta kaalmada adeegyada, kristopher reeder haycoy casey . So [facility] [phone].    ATENCIÓN: Si habla español, tiene a maciel disposición servicios gratuitos de asistencia lingüística. Llame al [phone].    We comply with applicable federal and state civil rights laws, including the [place] Human Rights Act. We do not discriminate on the basis of race, color, creed, Gnosticism, national origin, marital status, age, disability, sex, sexual orientation, or gender identity.       Thank you!    Thank you for choosing [facility] for your care. Our goal is always to provide you with excellent care. Hearing back from our patients is one way we can continue to improve our services. Please take a few minutes to complete the written survey that you may receive in the mail after you visit. If you would like to speak to someone directly about your visit please contact Patient Relations at [phone]. Thank you!               Medication List      ASK your doctor about these medications          Morning Afternoon Evening Bedtime As Needed    acetaminophen 325 MG tablet  Also known as:  TYLENOL  INSTRUCTIONS:  Take 2 tablets (650 mg) by mouth every 4 hours as needed for other (surgical pain)                     amoxicillin 500 MG capsule  Also known as:  AMOXIL  INSTRUCTIONS:  Take 4 pills 1 hour prior to dental work                     atorvastatin 10 MG tablet  Also known as:  LIPITOR  INSTRUCTIONS:  Take 1 tablet (10 mg) by mouth daily                     beclomethasone 40 MCG/ACT Aers IS A DISCONTINUED MEDICATION  Also known as:  QVAR  INSTRUCTIONS:  Inhale 1 puff into the lungs 2 times daily                     Flomax 0.4 MG capsule  INSTRUCTIONS:  Take 0.4 mg by mouth daily  Generic drug:  tamsulosin                     gabapentin 100 MG capsule  Also known as:  NEURONTIN  INSTRUCTIONS:  Take 1 pill po at bedtime                     latanoprost 0.005 % ophthalmic solution  Also known as:  XALATAN  INSTRUCTIONS:  Place 1 drop Into the left eye At Bedtime                     LEVOTHYROXINE SODIUM PO  INSTRUCTIONS:  Take 125 mcg by mouth daily                     LEXAPRO PO  INSTRUCTIONS:  Take 20 mg by mouth daily                     PROSCAR PO  INSTRUCTIONS:  Take 5 mg by mouth daily

## 2020-05-26 NOTE — IP AVS SNAPSHOT
[facility]  [address]  [facility] [address]  Phone:  [phone]                                    After Visit Summary   5/26/2020    Palomo Zapata    MRN: [id number]           After Visit Summary Signature Page    I have received my discharge instructions, and my questions have been answered. I have discussed any challenges I see with this plan with the nurse or doctor.    ..........................................................................................................................................  Patient/Patient Representative Signature      ..........................................................................................................................................  Patient Representative Print Name and Relationship to Patient    ..................................................               ................................................  Date                                   Time    ..........................................................................................................................................  Reviewed by Signature/Title    ...................................................              ..............................................  Date                                               Time          22EPIC Rev 08/18

## 2020-05-28 ENCOUNTER — HOSPITAL ENCOUNTER (OUTPATIENT)
Facility: CLINIC | Age: 75
Discharge: HOME | End: 2020-05-28
Attending: RADIOLOGY | Admitting: RADIOLOGY
Payer: MEDICARE

## 2020-05-28 ENCOUNTER — APPOINTMENT (OUTPATIENT)
Dept: INTERVENTIONAL RADIOLOGY/VASCULAR | Facility: CLINIC | Age: 75
End: 2020-05-28
Attending: RADIOLOGY
Payer: MEDICARE

## 2020-05-28 VITALS
HEART RATE: 74 BPM | RESPIRATION RATE: 13 BRPM | DIASTOLIC BLOOD PRESSURE: 61 MMHG | TEMPERATURE: 98.8 F | SYSTOLIC BLOOD PRESSURE: 109 MMHG | OXYGEN SATURATION: 96 %

## 2020-05-28 DIAGNOSIS — N13.30 HYDRONEPHROSIS: ICD-10-CM

## 2020-05-28 PROCEDURE — 25000125 ZZHC RX 250: Performed by: RADIOLOGY

## 2020-05-28 PROCEDURE — 25000128 H RX IP 250 OP 636: Performed by: RADIOLOGY

## 2020-05-28 PROCEDURE — C1729 CATH, DRAINAGE: HCPCS

## 2020-05-28 PROCEDURE — 50693 PLMT URETERAL STENT PRQ: CPT

## 2020-05-28 PROCEDURE — C1769 GUIDE WIRE: HCPCS

## 2020-05-28 PROCEDURE — 99152 MOD SED SAME PHYS/QHP 5/>YRS: CPT

## 2020-05-28 RX ORDER — FENTANYL CITRATE 50 UG/ML
25-50 INJECTION, SOLUTION INTRAMUSCULAR; INTRAVENOUS EVERY 5 MIN PRN
Status: DISCONTINUED | OUTPATIENT
Start: 2020-05-28 | End: 2020-05-28 | Stop reason: HOSPADM

## 2020-05-28 RX ORDER — DEXTROSE MONOHYDRATE 25 G/50ML
25-50 INJECTION, SOLUTION INTRAVENOUS
Status: CANCELLED | OUTPATIENT
Start: 2020-05-28

## 2020-05-28 RX ORDER — DEXTROSE MONOHYDRATE 25 G/50ML
25-50 INJECTION, SOLUTION INTRAVENOUS
Status: DISCONTINUED | OUTPATIENT
Start: 2020-05-28 | End: 2020-05-28 | Stop reason: HOSPADM

## 2020-05-28 RX ORDER — LIDOCAINE 40 MG/G
CREAM TOPICAL
Status: DISCONTINUED | OUTPATIENT
Start: 2020-05-28 | End: 2020-05-28 | Stop reason: HOSPADM

## 2020-05-28 RX ORDER — FENTANYL CITRATE 50 UG/ML
INJECTION, SOLUTION INTRAMUSCULAR; INTRAVENOUS
Status: DISCONTINUED
Start: 2020-05-28 | End: 2020-05-28 | Stop reason: HOSPADM

## 2020-05-28 RX ORDER — CEFTAZIDIME 1 G/1
1 INJECTION, POWDER, FOR SOLUTION INTRAMUSCULAR; INTRAVENOUS
Status: DISCONTINUED | OUTPATIENT
Start: 2020-05-28 | End: 2020-05-28 | Stop reason: HOSPADM

## 2020-05-28 RX ORDER — NICOTINE POLACRILEX 4 MG
15-30 LOZENGE BUCCAL
Status: CANCELLED | OUTPATIENT
Start: 2020-05-28

## 2020-05-28 RX ORDER — FLUMAZENIL 0.1 MG/ML
0.2 INJECTION, SOLUTION INTRAVENOUS
Status: DISCONTINUED | OUTPATIENT
Start: 2020-05-28 | End: 2020-05-28 | Stop reason: HOSPADM

## 2020-05-28 RX ORDER — NALOXONE HYDROCHLORIDE 0.4 MG/ML
.1-.4 INJECTION, SOLUTION INTRAMUSCULAR; INTRAVENOUS; SUBCUTANEOUS
Status: DISCONTINUED | OUTPATIENT
Start: 2020-05-28 | End: 2020-05-28 | Stop reason: HOSPADM

## 2020-05-28 RX ORDER — LIDOCAINE HYDROCHLORIDE 10 MG/ML
INJECTION, SOLUTION EPIDURAL; INFILTRATION; INTRACAUDAL; PERINEURAL
Status: DISCONTINUED
Start: 2020-05-28 | End: 2020-05-28 | Stop reason: HOSPADM

## 2020-05-28 RX ORDER — CEFTAZIDIME 1 G/1
INJECTION, POWDER, FOR SOLUTION INTRAMUSCULAR; INTRAVENOUS
Status: DISCONTINUED
Start: 2020-05-28 | End: 2020-05-28 | Stop reason: WASHOUT

## 2020-05-28 RX ORDER — NICOTINE POLACRILEX 4 MG
15-30 LOZENGE BUCCAL
Status: DISCONTINUED | OUTPATIENT
Start: 2020-05-28 | End: 2020-05-28 | Stop reason: HOSPADM

## 2020-05-28 RX ADMIN — FENTANYL CITRATE 50 MCG: 50 INJECTION INTRAMUSCULAR; INTRAVENOUS at 11:01

## 2020-05-28 RX ADMIN — MIDAZOLAM 1 MG: 1 INJECTION INTRAMUSCULAR; INTRAVENOUS at 11:01

## 2020-05-28 RX ADMIN — LIDOCAINE HYDROCHLORIDE 10 ML: 10 INJECTION, SOLUTION EPIDURAL; INFILTRATION; INTRACAUDAL; PERINEURAL at 11:00

## 2020-05-28 NOTE — DISCHARGE INSTRUCTIONS
Invasive Radiology Procedures Discharge Instructions         _____________________________________________________________________    Patient Name:  Palomo Zapata  Today's Date:  May 28, 2020    The doctor who did your procedure today was Dr. Sotelo.    Procedure:  Stent/Tube Exchange    If you have not received your results after 5 days, please call the doctor who ordered your test.  Diet and medicines    Go back to your normal diet.    You may start taking your normal medicines again (including Coumadin, or warfarin), as shown on your medicine sheet.    If you take aspirin, Plavix or other anti-platelet drugs: Start taking it tomorrow.    If take Coumadin (warfarin): Ask your doctor when to have your INR checked.    For minor pain, you may take Tylenol (acetaminophen) or Advil (ibuprofen).    Activity and puncture site     You may go back to normal activity in 24 hours. Wait 48 hours before lifting,   straining, exercise or other strenuous activity.    For the next day or two, check your puncture site often while you are awake.    Change the Band-Aid or bandage tomorrow.    You may shower tomorrow morning. No bathing or swimming until your   puncture site has fully healed.    If you received IV medicine to sedate you:  You were given: Versed  and Fentanyl  You may feel drowsy, forgetful or unsteady. For the next 24 hours, do not drive, drink alcohol or make any important decisions.    Know when to call for help  Call your doctor if you have:    A fever greater over 101 F (38.3 C), taken under the tongue.    A lot of bleeding or swelling at your puncture site.    Pain that is getting worse.     Shortness of breath.  Call 911 or go the emergency room if you have:    Severe chest pain or trouble breathing.    A tube that falls out.     Increased blood in your sputum (phlegm).    Bleeding that you cannot control.   Important phone numbers:  [facility] at [phone]

## 2020-05-28 NOTE — PRE-PROCEDURE
GENERAL PRE-PROCEDURE:   Procedure:  Retrograde ureterostomy placement    Written consent obtained?: Yes    Risks and benefits: Risks, benefits and alternatives were discussed    Consent given by:  Patient  Patient states understanding of procedure being performed: Yes    Patient's understanding of procedure matches consent: Yes    Procedure consent matches procedure scheduled: Yes    Expected level of sedation:  Moderate  Appropriately NPO:  Yes  ASA Class:  Class 3- Severe systemic disease, definite functional limitations  Mallampati  :  Grade 1- soft palate, uvula, tonsillar pillars, and posterior pharyngeal wall visible  Lungs:  Lungs clear with good breath sounds bilaterally  Heart:  Normal heart sounds and rate  History & Physical reviewed:  History and physical reviewed and no updates needed  Statement of review:  I have reviewed the lab findings, diagnostic data, medications, and the plan for sedation

## 2020-05-28 NOTE — IP AVS SNAPSHOT
[facility]  [address]  [facility] [address]  Phone:  [phone]                                    After Visit Summary   5/28/2020    Palomo Zapata    MRN: [id number]           After Visit Summary Signature Page    I have received my discharge instructions, and my questions have been answered. I have discussed any challenges I see with this plan with the nurse or doctor.    ..........................................................................................................................................  Patient/Patient Representative Signature      ..........................................................................................................................................  Patient Representative Print Name and Relationship to Patient    ..................................................               ................................................  Date                                   Time    ..........................................................................................................................................  Reviewed by Signature/Title    ...................................................              ..............................................  Date                                               Time          22EPIC Rev 08/18

## 2020-05-28 NOTE — IP AVS SNAPSHOT
MRN:[id number]                      After Visit Summary   5/28/2020    Palomo Zapata    MRN: [id number]           Visit Information        Department      5/28/2020  8:55 AM [facility]          Review of your medicines      UNREVIEWED medicines. Ask your doctor about these medicines       Dose / Directions   acetaminophen 325 MG tablet  Commonly known as:  TYLENOL  Used for:  Status post knee surgery      Dose:  650 mg  Take 2 tablets (650 mg) by mouth every 4 hours as needed for other (surgical pain)  Quantity:  100 tablet  Refills:  1     amoxicillin 500 MG capsule  Commonly known as:  AMOXIL  Used for:  Status post total left knee replacement      Take 4 pills 1 hour prior to dental work  Quantity:  12 capsule  Refills:  1     atorvastatin 10 MG tablet  Commonly known as:  LIPITOR  Used for:  Hyperlipidemia LDL goal <100      Dose:  10 mg  Take 1 tablet (10 mg) by mouth daily  Quantity:  30 tablet  Refills:  0     beclomethasone 40 MCG/ACT Aers IS A DISCONTINUED MEDICATION  Commonly known as:  QVAR      Dose:  1 puff  Inhale 1 puff into the lungs 2 times daily  Refills:  0     Flomax 0.4 MG capsule  Generic drug:  tamsulosin      Dose:  0.4 mg  Take 0.4 mg by mouth daily  Refills:  0     gabapentin 100 MG capsule  Commonly known as:  NEURONTIN  Used for:  Phantom limb pain (H)      Take 1 pill po at bedtime  Quantity:  30 capsule  Refills:  0     latanoprost 0.005 % ophthalmic solution  Commonly known as:  XALATAN      Dose:  1 drop  Place 1 drop Into the left eye At Bedtime  Refills:  0     LEVOTHYROXINE SODIUM PO      Dose:  125 mcg  Take 125 mcg by mouth daily  Refills:  0     LEXAPRO PO      Dose:  20 mg  Take 20 mg by mouth daily  Refills:  0     PROSCAR PO      Dose:  5 mg  Take 5 mg by mouth daily  Refills:  0              Protect others around you: Learn how to safely use, store and throw away your medicines at www.disposemymeds.org.       Follow-ups after your visit        Care Instructions       Further instructions from your care team         Invasive Radiology Procedures Discharge Instructions         _____________________________________________________________________    Patient Name:  Palomo Zapata  Today's Date:  May 28, 2020    The doctor who did your procedure today was Dr. Sotelo.    Procedure:  Stent/Tube Exchange    If you have not received your results after 5 days, please call the doctor who ordered your test.  Diet and medicines    Go back to your normal diet.    You may start taking your normal medicines again (including Coumadin, or warfarin), as shown on your medicine sheet.    If you take aspirin, Plavix or other anti-platelet drugs: Start taking it tomorrow.    If take Coumadin (warfarin): Ask your doctor when to have your INR checked.    For minor pain, you may take Tylenol (acetaminophen) or Advil (ibuprofen).    Activity and puncture site     You may go back to normal activity in 24 hours. Wait 48 hours before lifting,   straining, exercise or other strenuous activity.    For the next day or two, check your puncture site often while you are awake.    Change the Band-Aid or bandage tomorrow.    You may shower tomorrow morning. No bathing or swimming until your   puncture site has fully healed.    If you received IV medicine to sedate you:  You were given: Versed  and Fentanyl  You may feel drowsy, forgetful or unsteady. For the next 24 hours, do not drive, drink alcohol or make any important decisions.    Know when to call for help  Call your doctor if you have:    A fever greater over 101 F (38.3 C), taken under the tongue.    A lot of bleeding or swelling at your puncture site.    Pain that is getting worse.     Shortness of breath.  Call 911 or go the emergency room if you have:    Severe chest pain or trouble breathing.    A tube that falls out.     Increased blood in your sputum (phlegm).    Bleeding that you cannot control.   Important phone  numbers:  [facility] at [phone]    Additional Information About Your Visit       MyChart Information    [facility] gives you secure access to your electronic health record. If you see a primary care provider, you can also send messages to your care team and make appointments. If you have questions, please call your primary care clinic.  If you do not have a primary care provider, please call [phone] and they will assist you.       Care EveryWhere ID    This is your Care EveryWhere ID. This could be used by other organizations to access your [facility] medical records  [id number]       Your Vitals Were  Most recent update: 5/28/2020 10:48 AM    Blood Pressure   112/61 (BP Location: Right arm)    Pulse   74    Temperature   97.4  F (36.3  C) (Temporal)    Respirations   16    Pulse Oximetry   98%          Primary Care Provider Office Phone # Fax #    [facility] [phone]      Equal Access to Services    MELVIN OLSEN AH: Hadii aad ku hadasho Soomaali, waaxda luqadaha, qaybta kaalmada adeegyada, waxay odilonin hayshaqn osorio haywoodaradanny casey . So [facility] [phone].    ATENCIÓN: Si habla español, tiene a maciel disposición servicios gratuitos de asistencia lingüística. Llame al [phone].    We comply with applicable federal and state civil rights laws, including the [place] Human Rights Act. We do not discriminate on the basis of race, color, creed, Anglican, national origin, marital status, age, disability, sex, sexual orientation, or gender identity.       Thank you!    Thank you for choosing [facility] for your care. Our goal is always to provide you with excellent care. Hearing back from our patients is one way we can continue to improve our services. Please take a few minutes to complete the written survey that you may receive in the mail after you visit. If you would like to speak to someone directly about your visit please contact Patient Relations at  [phone]. Thank you!              Medication List      ASK your doctor about these medications          Morning Afternoon Evening Bedtime As Needed    acetaminophen 325 MG tablet  Also known as:  TYLENOL  INSTRUCTIONS:  Take 2 tablets (650 mg) by mouth every 4 hours as needed for other (surgical pain)                     amoxicillin 500 MG capsule  Also known as:  AMOXIL  INSTRUCTIONS:  Take 4 pills 1 hour prior to dental work                     atorvastatin 10 MG tablet  Also known as:  LIPITOR  INSTRUCTIONS:  Take 1 tablet (10 mg) by mouth daily                     beclomethasone 40 MCG/ACT Aers IS A DISCONTINUED MEDICATION  Also known as:  QVAR  INSTRUCTIONS:  Inhale 1 puff into the lungs 2 times daily                     Flomax 0.4 MG capsule  INSTRUCTIONS:  Take 0.4 mg by mouth daily  Generic drug:  tamsulosin                     gabapentin 100 MG capsule  Also known as:  NEURONTIN  INSTRUCTIONS:  Take 1 pill po at bedtime                     latanoprost 0.005 % ophthalmic solution  Also known as:  XALATAN  INSTRUCTIONS:  Place 1 drop Into the left eye At Bedtime                     LEVOTHYROXINE SODIUM PO  INSTRUCTIONS:  Take 125 mcg by mouth daily                     LEXAPRO PO  INSTRUCTIONS:  Take 20 mg by mouth daily                     PROSCAR PO  INSTRUCTIONS:  Take 5 mg by mouth daily

## 2020-05-28 NOTE — PROCEDURES
[facility]    Procedure: IR Procedure Note    Date/Time: 5/28/2020 11:42 AM  Performed by: Saw Ross MD  Authorized by: Saw Ross MD     UNIVERSAL PROTOCOL   Site Marked: NA  Prior Images Obtained and Reviewed:  Yes  Required items: Required blood products, implants, devices and special equipment available    Patient identity confirmed:  Verbally with patient, arm band, provided demographic data and hospital-assigned identification number  Patient was reevaluated immediately before administering moderate or deep sedation or anesthesia  Confirmation Checklist:  Patient's identity using two indicators, relevant allergies, procedure was appropriate and matched the consent or emergent situation and correct equipment/implants were available  Time out: Immediately prior to the procedure a time out was called    Universal Protocol: the Joint Commission Universal Protocol was followed    Preparation: Patient was prepped and draped in usual sterile fashion           ANESTHESIA    Anesthesia: Local infiltration  Local Anesthetic:  Lidocaine 1% without epinephrine      SEDATION    Patient Sedated: Yes    Sedation Type:  Moderate (conscious) sedation  Vital signs: Vital signs monitored during sedation    See dictated procedure note for full details.  Findings:      Specimens: none    Complications: None    Condition: Stable    Plan:      PROCEDURE   Patient Tolerance:  Patient tolerated the procedure well with no immediate complications  Describe Procedure: Right ureterostomy placement.  Length of time physician/provider present for 1:1 monitoring during sedation: 15

## 2021-01-01 ENCOUNTER — HEALTH MAINTENANCE LETTER (OUTPATIENT)
Age: 76
End: 2021-01-01

## 2021-01-15 ENCOUNTER — HEALTH MAINTENANCE LETTER (OUTPATIENT)
Age: 76
End: 2021-01-15

## 2022-01-01 ENCOUNTER — LAB REQUISITION (OUTPATIENT)
Dept: LAB | Facility: CLINIC | Age: 77
End: 2022-01-01
Payer: MEDICARE

## 2022-01-01 ENCOUNTER — HEALTH MAINTENANCE LETTER (OUTPATIENT)
Age: 77
End: 2022-01-01

## 2022-01-01 DIAGNOSIS — K92.2 GASTROINTESTINAL HEMORRHAGE, UNSPECIFIED: ICD-10-CM

## 2022-01-01 DIAGNOSIS — K57.30 DIVERTICULOSIS OF LARGE INTESTINE WITHOUT PERFORATION OR ABSCESS WITHOUT BLEEDING: ICD-10-CM

## 2022-01-01 DIAGNOSIS — K57.10 DIVERTICULOSIS OF SMALL INTESTINE WITHOUT PERFORATION OR ABSCESS WITHOUT BLEEDING: ICD-10-CM

## 2022-01-01 DIAGNOSIS — K63.3 ULCER OF INTESTINE: ICD-10-CM

## 2022-01-01 DIAGNOSIS — R12 HEARTBURN: ICD-10-CM

## 2022-01-01 DIAGNOSIS — D50.9 IRON DEFICIENCY ANEMIA, UNSPECIFIED: ICD-10-CM

## 2022-01-01 LAB
PATH REPORT.COMMENTS IMP SPEC: NORMAL
PATH REPORT.COMMENTS IMP SPEC: NORMAL
PATH REPORT.FINAL DX SPEC: NORMAL
PATH REPORT.GROSS SPEC: NORMAL
PATH REPORT.MICROSCOPIC SPEC OTHER STN: NORMAL
PATH REPORT.RELEVANT HX SPEC: NORMAL
PHOTO IMAGE: NORMAL

## 2022-01-01 PROCEDURE — 88305 TISSUE EXAM BY PATHOLOGIST: CPT | Performed by: PATHOLOGY

## 2022-01-01 PROCEDURE — 88305 TISSUE EXAM BY PATHOLOGIST: CPT | Mod: TC,ORL | Performed by: INTERNAL MEDICINE

## 2022-01-01 PROCEDURE — 88305 TISSUE EXAM BY PATHOLOGIST: CPT | Mod: 26 | Performed by: PATHOLOGY

## 2022-06-07 NOTE — PLAN OF CARE
Problem: Patient Care Overview  Goal: Plan of Care/Patient Progress Review  OT: Patient discharged to TCU prior to scheduled OT session.    Comments:   Occupational Therapy Discharge Summary     Reason for therapy discharge:    Discharged to transitional care facility.     Progress towards therapy goal(s). [facility] for goal details.  Goals not met.  Barriers to achieving goals:   limited tolerance for therapy and discharge from facility.     Therapy recommendation(s):    Continued therapy is recommended.  Rationale/Recommendations:  To maximze (I), safety, and endurance with ADL, IADL and mobility.          Olumiant Pregnancy And Lactation Text: Based on animal studies, Olumiant may cause embryo-fetal harm when administered to pregnant women.  The medication should not be used in pregnancy.  Breastfeeding is not recommended during treatment.

## 2023-01-01 NOTE — PLAN OF CARE
Problem: Patient Care Overview  Goal: Plan of Care/Patient Progress Review  Patient A/Ox4. VSS. Condom catheter draining. Pt had BM during the day after a suppository. ACE wrap CDI. Up with one assist, uses prosthesis. Oxycodone given for pain. Call light within reach. Will continue to monitor.           Family

## (undated) DEVICE — CAST PADDING 6" STERILE 9046S

## (undated) DEVICE — BONE CLEANING TIP INTERPULSE  0210-010-000

## (undated) DEVICE — SUCTION IRR SYSTEM W/O TIP INTERPULSE HANDPIECE 0210-100-000

## (undated) DEVICE — SU MONOCRYL 3-0 PS-1 27" Y936H

## (undated) DEVICE — BONE CEMENT MIXEVAC III HI VAC KIT  0206-015-000

## (undated) DEVICE — BLADE KNIFE SURG 15 371115

## (undated) DEVICE — DRSG STERI STRIP 1X5" R1548

## (undated) DEVICE — DRAIN HEMOVAC RESERVOIR KIT 10FR 1/8" MED 00-2550-002-10

## (undated) DEVICE — Device

## (undated) DEVICE — GLOVE PROTEXIS W/NEU-THERA 8.0  2D73TE80

## (undated) DEVICE — SOL NACL 0.9% IRRIG 1000ML BOTTLE 2F7124

## (undated) DEVICE — SYR 10ML FINGER CONTROL W/O NDL 309695

## (undated) DEVICE — GOWN XLG DISP 9545

## (undated) DEVICE — NDL 22GA 1.5"

## (undated) DEVICE — LINEN BACK PACK 5440

## (undated) DEVICE — PREP DURAPREP 26ML APL 8630

## (undated) DEVICE — LINEN GOWN X4 5410

## (undated) DEVICE — SUCTION MANIFOLD DORNOCH ULTRA CART UL-CL500

## (undated) DEVICE — PEN MARKING SKIN W/LABELS 31145918

## (undated) DEVICE — SOL NACL 0.9% IRRIG 3000ML BAG 2B7477

## (undated) DEVICE — GLOVE PROTEXIS BLUE W/NEU-THERA 7.5  2D73EB75

## (undated) DEVICE — ESU GROUND PAD UNIVERSAL W/O CORD

## (undated) DEVICE — TOURNIQUET CUFF 30" REPRO BLUE 60-7070-105

## (undated) DEVICE — DRAPE IOBAN INCISE 23X17" 6650EZ

## (undated) DEVICE — HOOD FLYTE W/PEELAWAY 408-800-100

## (undated) DEVICE — SU VICRYL 1 CT-1 27" UND J261H

## (undated) DEVICE — BLADE SAW SAGITTAL STRK 19.5X90X1.27MM 2108-109-000S11

## (undated) DEVICE — LINEN TOWEL PACK X5 5464

## (undated) DEVICE — DRSG DRAIN 4X4" 7086

## (undated) DEVICE — SU VICRYL 2-0 CT-1 27" UND J259H

## (undated) DEVICE — STRAP KNEE/BODY 31143004

## (undated) DEVICE — SOL WATER IRRIG 1000ML BOTTLE 2F7114

## (undated) DEVICE — GLOVE PROTEXIS BLUE W/NEU-THERA 8.0  2D73EB80

## (undated) RX ORDER — PHENYLEPHRINE HCL IN 0.9% NACL 1 MG/10 ML
SYRINGE (ML) INTRAVENOUS
Status: DISPENSED
Start: 2017-11-02

## (undated) RX ORDER — CLINDAMYCIN PHOSPHATE 900 MG/50ML
INJECTION, SOLUTION INTRAVENOUS
Status: DISPENSED
Start: 2017-11-02

## (undated) RX ORDER — ACETAMINOPHEN 325 MG/1
TABLET ORAL
Status: DISPENSED
Start: 2017-11-02

## (undated) RX ORDER — FENTANYL CITRATE 50 UG/ML
INJECTION, SOLUTION INTRAMUSCULAR; INTRAVENOUS
Status: DISPENSED
Start: 2017-11-02

## (undated) RX ORDER — HYDROMORPHONE HYDROCHLORIDE 1 MG/ML
INJECTION, SOLUTION INTRAMUSCULAR; INTRAVENOUS; SUBCUTANEOUS
Status: DISPENSED
Start: 2017-11-02

## (undated) RX ORDER — GABAPENTIN 300 MG/1
CAPSULE ORAL
Status: DISPENSED
Start: 2017-11-02

## (undated) RX ORDER — ONDANSETRON 2 MG/ML
INJECTION INTRAMUSCULAR; INTRAVENOUS
Status: DISPENSED
Start: 2017-11-02

## (undated) RX ORDER — CELECOXIB 200 MG/1
CAPSULE ORAL
Status: DISPENSED
Start: 2017-11-02

## (undated) RX ORDER — EPHEDRINE SULFATE 50 MG/ML
INJECTION, SOLUTION INTRAMUSCULAR; INTRAVENOUS; SUBCUTANEOUS
Status: DISPENSED
Start: 2017-11-02